# Patient Record
Sex: MALE | Race: WHITE | Employment: UNEMPLOYED | URBAN - METROPOLITAN AREA
[De-identification: names, ages, dates, MRNs, and addresses within clinical notes are randomized per-mention and may not be internally consistent; named-entity substitution may affect disease eponyms.]

---

## 2023-02-08 ENCOUNTER — APPOINTMENT (OUTPATIENT)
Dept: GENERAL RADIOLOGY | Age: 43
DRG: 280 | End: 2023-02-08
Attending: INTERNAL MEDICINE
Payer: COMMERCIAL

## 2023-02-08 ENCOUNTER — HOSPITAL ENCOUNTER (INPATIENT)
Age: 43
LOS: 7 days | Discharge: HOME | DRG: 280 | End: 2023-02-15
Attending: INTERNAL MEDICINE | Admitting: INTERNAL MEDICINE
Payer: COMMERCIAL

## 2023-02-08 DIAGNOSIS — I50.9 ACUTE DECOMPENSATED HEART FAILURE (HCC): Primary | ICD-10-CM

## 2023-02-08 PROBLEM — I21.4 NSTEMI (NON-ST ELEVATED MYOCARDIAL INFARCTION) (HCC): Status: ACTIVE | Noted: 2023-02-08

## 2023-02-08 LAB
A/G RATIO: 1.2 (ref 1.1–2.2)
ALBUMIN SERPL-MCNC: 3.6 G/DL (ref 3.4–5)
ALP BLD-CCNC: 93 U/L (ref 40–129)
ALT SERPL-CCNC: 20 U/L (ref 10–40)
ANION GAP SERPL CALCULATED.3IONS-SCNC: 9 MMOL/L (ref 3–16)
APTT: 24 SEC (ref 23–34.3)
AST SERPL-CCNC: 24 U/L (ref 15–37)
BILIRUB SERPL-MCNC: 0.5 MG/DL (ref 0–1)
BUN BLDV-MCNC: 12 MG/DL (ref 7–20)
CALCIUM SERPL-MCNC: 9.3 MG/DL (ref 8.3–10.6)
CHLORIDE BLD-SCNC: 101 MMOL/L (ref 99–110)
CO2: 32 MMOL/L (ref 21–32)
CREAT SERPL-MCNC: 0.8 MG/DL (ref 0.9–1.3)
GFR SERPL CREATININE-BSD FRML MDRD: >60 ML/MIN/{1.73_M2}
GLUCOSE BLD-MCNC: 90 MG/DL (ref 70–99)
HCT VFR BLD CALC: 44.3 % (ref 40.5–52.5)
HEMOGLOBIN: 14.5 G/DL (ref 13.5–17.5)
INR BLD: 1.01 (ref 0.87–1.14)
MCH RBC QN AUTO: 30.2 PG (ref 26–34)
MCHC RBC AUTO-ENTMCNC: 32.7 G/DL (ref 31–36)
MCV RBC AUTO: 92.2 FL (ref 80–100)
PDW BLD-RTO: 14.6 % (ref 12.4–15.4)
PLATELET # BLD: 193 K/UL (ref 135–450)
PMV BLD AUTO: 9.3 FL (ref 5–10.5)
POTASSIUM REFLEX MAGNESIUM: 4.5 MMOL/L (ref 3.5–5.1)
PROTHROMBIN TIME: 13.2 SEC (ref 11.7–14.5)
RBC # BLD: 4.81 M/UL (ref 4.2–5.9)
SODIUM BLD-SCNC: 142 MMOL/L (ref 136–145)
TOTAL PROTEIN: 6.6 G/DL (ref 6.4–8.2)
TROPONIN: 0.37 NG/ML
TROPONIN: 0.38 NG/ML
WBC # BLD: 7.6 K/UL (ref 4–11)

## 2023-02-08 PROCEDURE — 6370000000 HC RX 637 (ALT 250 FOR IP): Performed by: INTERNAL MEDICINE

## 2023-02-08 PROCEDURE — 85730 THROMBOPLASTIN TIME PARTIAL: CPT

## 2023-02-08 PROCEDURE — 2580000003 HC RX 258: Performed by: INTERNAL MEDICINE

## 2023-02-08 PROCEDURE — 80053 COMPREHEN METABOLIC PANEL: CPT

## 2023-02-08 PROCEDURE — 85027 COMPLETE CBC AUTOMATED: CPT

## 2023-02-08 PROCEDURE — 6360000002 HC RX W HCPCS: Performed by: INTERNAL MEDICINE

## 2023-02-08 PROCEDURE — 71045 X-RAY EXAM CHEST 1 VIEW: CPT

## 2023-02-08 PROCEDURE — 36415 COLL VENOUS BLD VENIPUNCTURE: CPT

## 2023-02-08 PROCEDURE — 99255 IP/OBS CONSLTJ NEW/EST HI 80: CPT | Performed by: INTERNAL MEDICINE

## 2023-02-08 PROCEDURE — 85520 HEPARIN ASSAY: CPT

## 2023-02-08 PROCEDURE — 2060000000 HC ICU INTERMEDIATE R&B

## 2023-02-08 PROCEDURE — 84484 ASSAY OF TROPONIN QUANT: CPT

## 2023-02-08 PROCEDURE — 83036 HEMOGLOBIN GLYCOSYLATED A1C: CPT

## 2023-02-08 PROCEDURE — 85610 PROTHROMBIN TIME: CPT

## 2023-02-08 RX ORDER — ONDANSETRON 4 MG/1
4 TABLET, ORALLY DISINTEGRATING ORAL EVERY 8 HOURS PRN
Status: DISCONTINUED | OUTPATIENT
Start: 2023-02-08 | End: 2023-02-15 | Stop reason: HOSPADM

## 2023-02-08 RX ORDER — ROSUVASTATIN CALCIUM 20 MG/1
40 TABLET, COATED ORAL NIGHTLY
Status: DISCONTINUED | OUTPATIENT
Start: 2023-02-08 | End: 2023-02-15 | Stop reason: HOSPADM

## 2023-02-08 RX ORDER — ACETAMINOPHEN 325 MG/1
650 TABLET ORAL EVERY 6 HOURS PRN
Status: DISCONTINUED | OUTPATIENT
Start: 2023-02-08 | End: 2023-02-10 | Stop reason: SDUPTHER

## 2023-02-08 RX ORDER — ASPIRIN 81 MG/1
81 TABLET, CHEWABLE ORAL DAILY
Status: DISCONTINUED | OUTPATIENT
Start: 2023-02-09 | End: 2023-02-15 | Stop reason: HOSPADM

## 2023-02-08 RX ORDER — FUROSEMIDE 10 MG/ML
20 INJECTION INTRAMUSCULAR; INTRAVENOUS ONCE
Status: COMPLETED | OUTPATIENT
Start: 2023-02-08 | End: 2023-02-08

## 2023-02-08 RX ORDER — POLYETHYLENE GLYCOL 3350 17 G/17G
17 POWDER, FOR SOLUTION ORAL DAILY PRN
Status: DISCONTINUED | OUTPATIENT
Start: 2023-02-08 | End: 2023-02-15 | Stop reason: HOSPADM

## 2023-02-08 RX ORDER — SODIUM CHLORIDE 0.9 % (FLUSH) 0.9 %
5-40 SYRINGE (ML) INJECTION EVERY 12 HOURS SCHEDULED
Status: DISCONTINUED | OUTPATIENT
Start: 2023-02-08 | End: 2023-02-15 | Stop reason: HOSPADM

## 2023-02-08 RX ORDER — ONDANSETRON 2 MG/ML
4 INJECTION INTRAMUSCULAR; INTRAVENOUS EVERY 6 HOURS PRN
Status: DISCONTINUED | OUTPATIENT
Start: 2023-02-08 | End: 2023-02-15 | Stop reason: HOSPADM

## 2023-02-08 RX ORDER — HEPARIN SODIUM 10000 [USP'U]/100ML
5-30 INJECTION, SOLUTION INTRAVENOUS CONTINUOUS
Status: DISPENSED | OUTPATIENT
Start: 2023-02-08 | End: 2023-02-10

## 2023-02-08 RX ORDER — SODIUM CHLORIDE 0.9 % (FLUSH) 0.9 %
5-40 SYRINGE (ML) INJECTION PRN
Status: DISCONTINUED | OUTPATIENT
Start: 2023-02-08 | End: 2023-02-15 | Stop reason: HOSPADM

## 2023-02-08 RX ORDER — HEPARIN SODIUM 1000 [USP'U]/ML
4000 INJECTION, SOLUTION INTRAVENOUS; SUBCUTANEOUS PRN
Status: DISCONTINUED | OUTPATIENT
Start: 2023-02-08 | End: 2023-02-10

## 2023-02-08 RX ORDER — ACETAMINOPHEN 650 MG/1
650 SUPPOSITORY RECTAL EVERY 6 HOURS PRN
Status: DISCONTINUED | OUTPATIENT
Start: 2023-02-08 | End: 2023-02-10 | Stop reason: SDUPTHER

## 2023-02-08 RX ORDER — HEPARIN SODIUM 1000 [USP'U]/ML
2000 INJECTION, SOLUTION INTRAVENOUS; SUBCUTANEOUS PRN
Status: DISCONTINUED | OUTPATIENT
Start: 2023-02-08 | End: 2023-02-10

## 2023-02-08 RX ORDER — SODIUM CHLORIDE 9 MG/ML
INJECTION, SOLUTION INTRAVENOUS PRN
Status: DISCONTINUED | OUTPATIENT
Start: 2023-02-08 | End: 2023-02-15 | Stop reason: HOSPADM

## 2023-02-08 RX ADMIN — SODIUM CHLORIDE, PRESERVATIVE FREE 10 ML: 5 INJECTION INTRAVENOUS at 22:00

## 2023-02-08 RX ADMIN — NITROGLYCERIN 0.5 INCH: 20 OINTMENT TOPICAL at 22:04

## 2023-02-08 RX ADMIN — ROSUVASTATIN 40 MG: 20 TABLET, FILM COATED ORAL at 22:01

## 2023-02-08 RX ADMIN — FUROSEMIDE 20 MG: 10 INJECTION, SOLUTION INTRAMUSCULAR; INTRAVENOUS at 17:54

## 2023-02-08 RX ADMIN — HEPARIN SODIUM 10 UNITS/KG/HR: 10000 INJECTION, SOLUTION INTRAVENOUS at 17:58

## 2023-02-08 NOTE — PROGRESS NOTES
Received patient from EMS transport as a direct admit from [facility]. Patient currently cannot be admitted into Epic system due to administration, there are currently no orders with exception to admit to inpatient. Patient in no distress, laughing and joking with staff. Report from EMS includes having no\ issues on the way here. Patient was on heparin gtt but due to no order, stopped gtt until provider can be updated. Sent secure message to Dr Stephen Mcguire who stated Dr Markus Gamboa was oncall. Sent perfect serve to that provider as well.

## 2023-02-08 NOTE — CONSULTS
Cardiology Consultation/History and Physical                                                                  Pt Name: Nayely Madrid  Age: 40 y.o. Sex: male  : 1980  Location: [id number]/7856-23    Referring Physician: Melina Ramirez MD        Reason for Consult:     Reason for Consultation/Chief Complaint: Chest pain    HPI:      Nayely Madrid is a 40 y.o. male with a past medical history of hypertension, PENG?, tobacco use who was transferred from [facility] with complaint of chest pain. Patient apparently has been having intermittent chest discomfort for over a month. At some point the pain was fairly constant and he decided to present to [facility] for further evaluation. His troponin was elevated there. The plan was to transfer the patient to [facility] for possible cardiac catheterization. Unfortunately, this did not happen for 2 days bed availability. Currently pain-free    Histories     Past Medical History:   has a past medical history of Chronic kidney disease (CKD), Hemodialysis patient ([address] and Hypertension. Surgical History:   has no past surgical history on file. Social History:   reports that he has been smoking cigarettes. He has a 22.00 pack-year smoking history. He has been exposed to tobacco smoke. He has never used smokeless tobacco. He reports that he does not drink alcohol and does not use drugs. Family History:  No evidence for sudden cardiac death or premature CAD      Medications:       Home Medications  Were reviewed and are listed in nursing record. and/or listed below  Prior to Admission medications    Medication Sig Start Date End Date Taking? Authorizing Provider   ibuprofen (IBU) 600 MG tablet Take 1 tablet by mouth every 6 hours as needed for Pain for 20 doses.  13   Leticia Barboza MD          Inpatient Medications:   sodium chloride flush  5-40 mL IntraVENous 2 times per day    [START ON 2023] aspirin  81 mg Oral Daily    rosuvastatin 40 mg Oral Nightly       IV drips:   sodium chloride      heparin (PORCINE) Infusion 10 Units/kg/hr (02/08/23 8023)       PRN:  sodium chloride flush, sodium chloride, ondansetron **OR** ondansetron, acetaminophen **OR** acetaminophen, polyethylene glycol, heparin (porcine), heparin (porcine), perflutren lipid microspheres    Allergy:     Patient has no known allergies. Review of Systems:     All 12 point review of symptoms completed. Pertinent positives identified in the HPI, all other review of symptoms negative as below. CONSTITUTIONAL: No fatigue  SKIN: No rash or pruritis. EYES: No visual changes or diplopia. No scleral icterus. ENT: No Headaches, hearing loss or vertigo. No mouth sores or sore throat. CARDIOVASCULAR: see HPI  RESPIRATORY: see HPI  GASTROINTESTINAL: No N/V/D. No abdominal pain, appetite loss, blood in stools. GENITOURINARY: No dysuria, trouble voiding, or hematuria. MUSCULOSKELETAL:  No gait disturbance, weakness or joint complaints. NEUROLOGICAL: No headache, diplopia, change in muscle strength, numbness or tingling. No change in gait, balance, coordination, mood, affect, memory, mentation, behavior. ENDOCRINE: No excessive thirst, fluid intake, or urination. No tremor. HEMATOLOGIC: No abnormal bruising or bleeding. ALLERGY: No nasal congestion or hives.       Physical Examination:     Vitals:    02/08/23 1620   BP: 115/85   Pulse: (!) 113   Resp: 18   Temp: 98 °F (36.7 °C)   TempSrc: Oral   SpO2: 96%   Weight: 210 lb 1.6 oz (95.3 kg)   Height: 5' 11\" (1.803 m)       Wt Readings from Last 3 Encounters:   02/08/23 210 lb 1.6 oz (95.3 kg)         General Appearance:  Alert, cooperative, no distress, appears stated age Appropriate weight   Head:  Normocephalic, without obvious abnormality, atraumatic   Eyes:  PERRL, conjunctiva/corneas clear EOM intact  Ears normal   Throat no lesions       Nose: Nares normal, no drainage or sinus tenderness   Throat: Lips, mucosa, and tongue normal   Neck: Supple, symmetrical, trachea midline, no adenopathy, thyroid: not enlarged, symmetric, no tenderness/mass/nodules, no carotid bruit. Lungs:   Respirations unlabored, clear to auscultation bilaterally, without any wheezes, rubs or ronchi. Chest Wall:  No tenderness or deformity   Heart:  Regular rhythm, rate is controlled, S1, S2 normal, there is no murmur, there is no rub or gallop, cannot assess jvd, no bilateral lower extremity edema   Abdomen:   Soft, non-tender, bowel sounds active all four quadrants,  no masses, no organomegaly       Extremities: Extremities normal, atraumatic, no cyanosis. Pulses: 2+ and symmetric   Skin: Skin color, texture, turgor normal, no rashes or lesions   Pysch: Normal mood and affect   Neurologic: Normal gross motor and sensory exam.  Cranial nerves intact        Labs:     No results for input(s): NA, K, BUN, CREATININE, CL, CO2, GLUCOSE, CALCIUM, MG in the last 72 hours. No results for input(s): WBC, HGB, HCT, PLT, MCV in the last 72 hours. No results for input(s): CHOLTOT, TRIG, HDL, CHOLHDL, LDL in the last 72 hours. Invalid input(s): Rain Level  No results for input(s): PTT, INR in the last 72 hours. Invalid input(s): PT  No results for input(s): CKTOTAL, CKMB, CKMBINDEX, TROPONINI in the last 72 hours. No results for input(s): BNP in the last 72 hours. No results for input(s): TSH in the last 72 hours. No results for input(s): CHOL, HDL, LDLCALC, TRIG in the last 72 hours.]    No results found for: CKTOTAL, CKMB, CKMBINDEX, TROPONINI      Imaging:     CT of the chest reviewed feels patchy bilateral air space disease consistent with edema versus pneumonia      Assessment / Plan:     NSTEMI  Hypoxemic respiratory failure  Acute kidney injury?     Obtain labs today CBC, CMP, troponin  Remains fairly hypoxic  Chest x-ray, will need to be respiratory status optimized prior to cardiac catheterization  Anti-Platelet therapy with aspirin  Heparin gtt (high risk med) will need to monitor labs for toxicity  Plan for left heart cath, possible PCI tomorrow afternoon if able to lay flat  Risks,  benefits and alternatives to cardiac catheterization and possible intervention were discussed with the patient bleeding heart attack, stroke, kidney failure and limb loss. He wishes to proceed. I have personally reviewed the reports and images of labs, radiological studies, cardiac studies including ECG's and telemetry, current and old medical records. The note was completed using EMR and Dragon dictation system. Every effort was made to ensure accuracy; however, inadvertent computerized transcription errors may be present. I would like to thank you for providing me the opportunity to participate in the care of your patient. If you have any questions, please do not hesitate to contact me.      Inés Cox MD, [facility]  The [address]  [address] [place] [id number]  Ph: [phone]  Fax: [phone]

## 2023-02-08 NOTE — PROGRESS NOTES
Patient admitted to 4314 w/ c/o SOB, no chest pains. States that he noticed an increase in SOB while ambulating since 1/17/2023. Reports his PCP had done labs but did not do anything else. Reports he came to the ER b/c the SOB was getting worse. He presents being diaphoretic but denies any SOB. Able to wean from 3L to 2L, w/ goal to SpO2 above 89%. Dr Amisha Cee at bedside and wants O2 to be weaned down as tolerated. Skin check revealed no skin issues besides being diaphoretic and skin red in color, reports needing glasses but does not have them and left his upper/lower dentures at home. Heparin gtt ordered to restart at initial rate of 10 ml/hour until AntiXa levels resulted. Reports having a history of CKD with being on HD for 190 days 3xweek, states his CKD was from unknown origin.

## 2023-02-08 NOTE — H&P
Hospital Medicine History & Physical      PCP: No primary care provider on file. Date of Admission: 2/8/2023    Date of Service: Pt seen/examined on 02/08/2023 and Admitted to Inpatient with expected LOS greater than two midnights due to medical therapy. Chief Complaint:  shortness of breath worse w/ exertion      History Of Present Illness:     40 y.o. male Farrah Romero history of PENG needing dialysis for 6 months, nicotine dependence 1 pack/day x 20 years presented to [facility] on 2/6 with complains of shortness of breath worse with exertion. 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 Patient's d-dimer is greatly elevated so a CT anterior of the chest was performed which was negative for PE but did confirm bilateral airspace disease. Initially was planning to transfer to [facility].  02/08 morning, had an episode of diaphoresis, repeat troponin was 0.87, EKG shows inferior and V4 to V6 T wave inversions, patient was transferred to [facility]. for further management of NSTEMI. On my evaluation he says his shortness of breath is improved, he was on 4 L oxygen, he was satting 95%, able to come down on 3 L, will continue to wean him off      Past Medical History:          Diagnosis Date    Chronic kidney disease (CKD) 02/01/2018    Estimated by patient    Hemodialysis patient ([address]     Reports 190 days of 3x week    Hypertension        Past Surgical History:      No past surgical history on file. Medications Prior to Admission:      Prior to Admission medications    Medication Sig Start Date End Date Taking?  Authorizing Provider   ibuprofen (IBU) 600 MG tablet Take 1 tablet by mouth every 6 hours as needed for Pain for 20 doses. 12/4/13   Saran Boyce MD       Allergies:  Patient has no known allergies. Social History:      The patient currently lives at home    TOBACCO:   reports that he has been smoking cigarettes. He has a 22.00 pack-year smoking history. He has been exposed to tobacco smoke. He has never used smokeless tobacco.  ETOH:   reports no history of alcohol use. Family History:      reviewed    No family history on file. REVIEW OF SYSTEMS:   Pertinent positives as noted in the HPI. All other systems reviewed and negative. PHYSICAL EXAM PERFORMED:    /85   Pulse (!) 113   Temp 98 °F (36.7 °C) (Oral)   Resp 18   Ht 5' 11\" (1.803 m)   Wt 216 lb (98 kg)   SpO2 96%   BMI 30.13 kg/m²     General appearance:  No apparent distress, appears stated age and cooperative. HEENT:  Normal cephalic, atraumatic without obvious deformity. Pupils equal, round, and reactive to light. Extra ocular muscles intact. Conjunctivae/corneas clear. Neck: Supple, with full range of motion. No jugular venous distention. Trachea midline. Respiratory:  Normal respiratory effort. Bibasilar crackles present  Cardiovascular:  Regular rate and rhythm with normal S1/S2 without murmurs, rubs or gallops. Abdomen: Soft, non-tender, non-distended with normal bowel sounds. Musculoskeletal:  No clubbing, cyanosis or edema bilaterally. Full range of motion without deformity. Skin: Skin color, texture, turgor normal.  No rashes or lesions. Neurologic:  Neurovascularly intact without any focal sensory/motor deficits. Cranial nerves: II-XII intact, grossly non-focal.  Psychiatric:  Alert and oriented, thought content appropriate, normal insight  Capillary Refill: Brisk,< 3 seconds   Peripheral Pulses: +2 palpable, equal bilaterally       Labs:     No results for input(s): WBC, HGB, HCT, PLT in the last 72 hours.   No results for input(s): NA, K, CL, CO2, BUN, CREATININE, CALCIUM, PHOS in the last 72 hours.    Invalid input(s): MAGNES  No results for input(s): AST, ALT, BILIDIR, BILITOT, ALKPHOS in the last 72 hours. No results for input(s): INR in the last 72 hours. No results for input(s): Prentis Revels in the last 72 hours. Urinalysis:    No results found for: [facility] [address], [address] [facility], [facility] [address], [facility]    Radiology:     CXR: I have reviewed the CXR with the following interpretation: ordered  EKG:  I have reviewed the EKG with the following interpretation:   Reviewed EKGs from [address] [place] [address], see HPI    CTPA for elevated D-Dimer @ OSH ([address] [place] [address])  IMPRESSION:  1. Bilateral infiltrates which are patchy groundglass and diffuse suspicious for mild bilateral pneumonia. There is mediastinal adenopathy possibly reactive. 2. Small bilateral pleural effusion  Follow-up until resolution is suggested  3. Other findings as described    No orders to display       ASSESSMENT/PLAN:    Active Hospital Problems    Diagnosis Date Noted    NSTEMI (non-ST elevated myocardial infarction) ([facility] [address] [I21.4] 02/08/2023     Priority: Medium     NSTEMI, Trop elevation up to 1.8 initial EKG at [facility] showed inferior lateral ST depressions, EKG repeated 02/08 with T wave inversions in inferior and left lateral leads. His troponin 02/08 morning was 0.87. His shortness of breath is improved but still there on exertion.   We will repeat labs including CBC, APTT restart heparin drip according to the levels  Cardiology consulted for evaluation  Monitor on telemetry    Heart failure exacerbation with pulmonary edema, was on IV diuresis in [facility], will give 20 mg IV Lasix pending labs  Check echocardiogram for further evaluation of structural heart abnormalities    Acute resp failure w/ hypoxemia due to pulmonary edema  Give IV Lasix  Continue wean off oxygen for SpO2 89-90%    Nicotine dependence, he wants to quit does not want nicotine patch, I have appreciated him on his decision    History of CKD? History of PENG in the past but he says he was at [facility] a needed dialysis about 190 days. Monitor renal profile closely  We will try to obtain records from [facility]    DVT Prophylaxis: Heparin gtt  Diet: ADULT DIET; Clear Liquid; No Caffeine  Code Status: Full Code    PT/OT Eval Status: evaluate daily, order if needed    Dispo - Admit as inpatient. I anticipate hospitalization spanning more than two midnights for investigation and treatment of the above medically necessary diagnoses. Dorothy Gibson MD, [facility]  Hospitalist  Attending Physician    Thank you No primary care provider on file. for the opportunity to be involved in this patient's care. If you have any questions or concerns please feel free to contact me at [phone].

## 2023-02-09 ENCOUNTER — APPOINTMENT (OUTPATIENT)
Dept: CARDIAC CATH/INVASIVE PROCEDURES | Age: 43
DRG: 280 | End: 2023-02-09
Attending: INTERNAL MEDICINE
Payer: COMMERCIAL

## 2023-02-09 ENCOUNTER — APPOINTMENT (OUTPATIENT)
Dept: GENERAL RADIOLOGY | Age: 43
DRG: 280 | End: 2023-02-09
Attending: INTERNAL MEDICINE
Payer: COMMERCIAL

## 2023-02-09 PROBLEM — E78.2 MIXED HYPERLIPIDEMIA: Status: ACTIVE | Noted: 2023-02-09

## 2023-02-09 PROBLEM — J96.01 ACUTE RESPIRATORY FAILURE WITH HYPOXEMIA (HCC): Status: ACTIVE | Noted: 2023-02-09

## 2023-02-09 LAB
ANTI-XA UNFRAC HEPARIN: 0.33 IU/ML (ref 0.3–0.7)
ANTI-XA UNFRAC HEPARIN: <0.1 IU/ML (ref 0.3–0.7)
ANTI-XA UNFRAC HEPARIN: <0.1 IU/ML (ref 0.3–0.7)
CHOLESTEROL, TOTAL: 155 MG/DL (ref 0–199)
EKG ATRIAL RATE: 108 BPM
EKG DIAGNOSIS: NORMAL
EKG P AXIS: 71 DEGREES
EKG P-R INTERVAL: 144 MS
EKG Q-T INTERVAL: 362 MS
EKG QRS DURATION: 104 MS
EKG QTC CALCULATION (BAZETT): 485 MS
EKG R AXIS: 66 DEGREES
EKG T AXIS: 228 DEGREES
EKG VENTRICULAR RATE: 108 BPM
ESTIMATED AVERAGE GLUCOSE: 122.6 MG/DL
HBA1C MFR BLD: 5.9 %
HCT VFR BLD CALC: 45.3 % (ref 40.5–52.5)
HDLC SERPL-MCNC: 25 MG/DL (ref 40–60)
HEMOGLOBIN: 14.9 G/DL (ref 13.5–17.5)
LDL CHOLESTEROL CALCULATED: 116 MG/DL
LV EF: 20 %
LVEF MODALITY: NORMAL
MCH RBC QN AUTO: 30.6 PG (ref 26–34)
MCHC RBC AUTO-ENTMCNC: 32.9 G/DL (ref 31–36)
MCV RBC AUTO: 93.1 FL (ref 80–100)
PDW BLD-RTO: 14.5 % (ref 12.4–15.4)
PLATELET # BLD: 205 K/UL (ref 135–450)
PMV BLD AUTO: 9 FL (ref 5–10.5)
RBC # BLD: 4.86 M/UL (ref 4.2–5.9)
TRIGL SERPL-MCNC: 70 MG/DL (ref 0–150)
VLDLC SERPL CALC-MCNC: 14 MG/DL
WBC # BLD: 8.8 K/UL (ref 4–11)

## 2023-02-09 PROCEDURE — 2500000003 HC RX 250 WO HCPCS

## 2023-02-09 PROCEDURE — 80061 LIPID PANEL: CPT

## 2023-02-09 PROCEDURE — 93005 ELECTROCARDIOGRAM TRACING: CPT | Performed by: INTERNAL MEDICINE

## 2023-02-09 PROCEDURE — 2060000000 HC ICU INTERMEDIATE R&B

## 2023-02-09 PROCEDURE — 6370000000 HC RX 637 (ALT 250 FOR IP): Performed by: INTERNAL MEDICINE

## 2023-02-09 PROCEDURE — 36415 COLL VENOUS BLD VENIPUNCTURE: CPT

## 2023-02-09 PROCEDURE — 6360000002 HC RX W HCPCS

## 2023-02-09 PROCEDURE — 6360000002 HC RX W HCPCS: Performed by: INTERNAL MEDICINE

## 2023-02-09 PROCEDURE — 85027 COMPLETE CBC AUTOMATED: CPT

## 2023-02-09 PROCEDURE — 6360000004 HC RX CONTRAST MEDICATION: Performed by: INTERNAL MEDICINE

## 2023-02-09 PROCEDURE — C8929 TTE W OR WO FOL WCON,DOPPLER: HCPCS

## 2023-02-09 PROCEDURE — 99233 SBSQ HOSP IP/OBS HIGH 50: CPT | Performed by: NURSE PRACTITIONER

## 2023-02-09 PROCEDURE — 0202U NFCT DS 22 TRGT SARS-COV-2: CPT

## 2023-02-09 PROCEDURE — 71046 X-RAY EXAM CHEST 2 VIEWS: CPT

## 2023-02-09 PROCEDURE — 93010 ELECTROCARDIOGRAM REPORT: CPT | Performed by: INTERNAL MEDICINE

## 2023-02-09 PROCEDURE — 85520 HEPARIN ASSAY: CPT

## 2023-02-09 RX ORDER — METOPROLOL SUCCINATE 25 MG/1
12.5 TABLET, EXTENDED RELEASE ORAL DAILY
Status: DISCONTINUED | OUTPATIENT
Start: 2023-02-09 | End: 2023-02-10

## 2023-02-09 RX ORDER — FUROSEMIDE 10 MG/ML
40 INJECTION INTRAMUSCULAR; INTRAVENOUS 2 TIMES DAILY
Status: DISCONTINUED | OUTPATIENT
Start: 2023-02-09 | End: 2023-02-10

## 2023-02-09 RX ORDER — POTASSIUM CHLORIDE 20 MEQ/1
20 TABLET, EXTENDED RELEASE ORAL ONCE
Status: COMPLETED | OUTPATIENT
Start: 2023-02-09 | End: 2023-02-09

## 2023-02-09 RX ADMIN — HEPARIN SODIUM 4000 UNITS: 1000 INJECTION INTRAVENOUS; SUBCUTANEOUS at 17:52

## 2023-02-09 RX ADMIN — METOPROLOL SUCCINATE 12.5 MG: 25 TABLET, EXTENDED RELEASE ORAL at 17:53

## 2023-02-09 RX ADMIN — HEPARIN SODIUM 18 UNITS/KG/HR: 10000 INJECTION, SOLUTION INTRAVENOUS at 17:52

## 2023-02-09 RX ADMIN — PERFLUTREN 1.5 ML: 6.52 INJECTION, SUSPENSION INTRAVENOUS at 13:27

## 2023-02-09 RX ADMIN — ROSUVASTATIN 40 MG: 20 TABLET, FILM COATED ORAL at 19:52

## 2023-02-09 RX ADMIN — POTASSIUM CHLORIDE 20 MEQ: 1500 TABLET, EXTENDED RELEASE ORAL at 17:30

## 2023-02-09 RX ADMIN — HEPARIN SODIUM 4000 UNITS: 1000 INJECTION INTRAVENOUS; SUBCUTANEOUS at 00:53

## 2023-02-09 RX ADMIN — FUROSEMIDE 40 MG: 10 INJECTION, SOLUTION INTRAMUSCULAR; INTRAVENOUS at 17:32

## 2023-02-09 RX ADMIN — NITROGLYCERIN 0.5 INCH: 20 OINTMENT TOPICAL at 12:43

## 2023-02-09 RX ADMIN — ASPIRIN 81 MG 81 MG: 81 TABLET ORAL at 09:24

## 2023-02-09 RX ADMIN — NITROGLYCERIN 0.5 INCH: 20 OINTMENT TOPICAL at 06:43

## 2023-02-09 NOTE — PROGRESS NOTES
Found patient asleep with oxygen off. Patient was satting at 85-86% on RA. Replaced NC and increased to 4L d/t slow recovery. Will continue to monitor and wean as appropriate.

## 2023-02-09 NOTE — PLAN OF CARE
Problem: Respiratory - Adult  Goal: Achieves optimal ventilation and oxygenation  Outcome: Progressing  Flowsheets (Taken 2/9/2023 0654)  Achieves optimal ventilation and oxygenation:   Assess for changes in respiratory status   Assess for changes in mentation and behavior   Position to facilitate oxygenation and minimize respiratory effort   Oxygen supplementation based on oxygen saturation or arterial blood gases   Initiate smoking cessation protocol as indicated   Encourage broncho-pulmonary hygiene including cough, deep breathe, incentive spirometry     Problem: Cardiovascular - Adult  Goal: Maintains optimal cardiac output and hemodynamic stability  Outcome: Progressing  Flowsheets (Taken 2/9/2023 0654)  Maintains optimal cardiac output and hemodynamic stability:   Monitor blood pressure and heart rate   Monitor urine output and notify Licensed Independent Practitioner for values outside of normal range   Assess for signs of decreased cardiac output     Problem: Cardiovascular - Adult  Goal: Absence of cardiac dysrhythmias or at baseline  Outcome: Progressing  Flowsheets (Taken 2/9/2023 0654)  Absence of cardiac dysrhythmias or at baseline:   Monitor cardiac rate and rhythm   Assess for signs of decreased cardiac output     Problem: Safety - Adult  Goal: Free from fall injury  Outcome: Progressing  Flowsheets (Taken 2/9/2023 0654)  Free From Fall Injury: Instruct family/caregiver on patient safety     Problem: ABCDS Injury Assessment  Goal: Absence of physical injury  Outcome: Progressing  Flowsheets (Taken 2/9/2023 0654)  Absence of Physical Injury: Implement safety measures based on patient assessment

## 2023-02-09 NOTE — PROGRESS NOTES
Pt off floor for echo. Dr. Alexandre Mansfield wants heparin gtt d/c per Forest Donovan. Called cardiology since pt is at echo and cardiology nurse to d/c heparin gtt.

## 2023-02-09 NOTE — PROGRESS NOTES
HOSPITALISTS PROGRESS NOTE    2/9/2023 1:47 PM        Name: Jena Navas . Admitted: 2/8/2023  Primary Care Provider: No primary care provider on file. (Tel: None)      Problem List  Active Problems:    NSTEMI (non-ST elevated myocardial infarction) ([address]  Resolved Problems:    * No resolved hospital problems. *       Assessment & Plan:   NSTEMI  Cath planning  Asa/statin heparin drip, cardiology consulted    Concerns of heart failure on outside hospital  Follow-up on echocardiogram    Patient does mention that he was on dialysis for about 6 months, renal function stable    Tobacco abuse    IV Access: Peripheral  Nunez: No  Diet: ADULT DIET; Clear Liquid; No Caffeine  Code:Full Code  DVT PPX heparin  Disposition monitor in the hospital      Brief Tuuwlq49 y.o. male Jena Navas history of PENG needing dialysis for 6 months, nicotine dependence 1 pack/day x 20 years presented to [facility] on 2/6 with complains of shortness of breath worse with exertion. 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 Patient's d-dimer is greatly elevated so a CT anterior of the chest was performed which was negative for PE but did confirm bilateral airspace disease. Initially was planning to transfer to [facility].  02/08 morning, had an episode of diaphoresis, repeat troponin was 0.87, EKG shows inferior and V4 to V6 T wave inversions, patient was transferred to [facility]. for further management of NSTEMI. CC: Chest pain    Subjective:  .     Patient is comfortable, patient denying any chest pain to me, patient is awaiting for his testing, patient is not on any oxygen    Reviewed interval ancillary notes    Current Medications  sodium chloride flush 0.9 % injection 5-40 mL, 2 times per day  sodium chloride flush 0.9 % injection 5-40 mL, PRN  0.9 % sodium chloride infusion, PRN  ondansetron (ZOFRAN-ODT) disintegrating tablet 4 mg, Q8H PRN   Or  ondansetron (ZOFRAN) injection 4 mg, Q6H PRN  acetaminophen (TYLENOL) tablet 650 mg, Q6H PRN   Or  acetaminophen (TYLENOL) suppository 650 mg, Q6H PRN  polyethylene glycol (GLYCOLAX) packet 17 g, Daily PRN  aspirin chewable tablet 81 mg, Daily  rosuvastatin (CRESTOR) tablet 40 mg, Nightly  heparin (porcine) injection 4,000 Units, PRN  heparin (porcine) injection 2,000 Units, PRN  heparin 25,000 units in dextrose 5% 250 mL (premix) infusion, Continuous  [Held by provider] nitroglycerin (NITRO-BID) 2 % ointment 0.5 inch, 4 times per day        Objective:  BP (!) 134/95   Pulse (!) 112   Temp 97.9 °F (36.6 °C) (Oral)   Resp 20   Ht 5' 11\" (1.803 m)   Wt 203 lb 11.3 oz (92.4 kg)   SpO2 96%   BMI 28.41 kg/m²     Intake/Output Summary (Last 24 hours) at 2/9/2023 1347  Last data filed at 2/9/2023 1238  Gross per 24 hour   Intake 480 ml   Output 4300 ml   Net -3820 ml      Wt Readings from Last 3 Encounters:   02/09/23 203 lb 11.3 oz (92.4 kg)     Alert awake oriented x3  Good diuresis of about 4 L  S1-S2 heard  No significant pedal edema  Neuro mental eyes moist  Lungs are clear I do not hear any crackles now    Labs and Tests:  CBC:   Recent Labs     02/08/23  1826 02/09/23  0649   WBC 7.6 8.8   HGB 14.5 14.9    205     BMP:    Recent Labs     02/08/23  1825      K 4.5      CO2 32   BUN 12   CREATININE 0.8*   GLUCOSE 90     Hepatic:   Recent Labs     02/08/23  1825   AST 24   ALT 20   BILITOT 0.5   ALKPHOS 93     XR CHEST 1 VIEW   Final Result   1. There is some hazy groundglass opacity seen projecting over the right midlung.  This may reflect some overlying soft tissue attenuation related to rotated patient positioning. Asymmetric pulmonary edema or pneumonitis is a differential consideration. This could be better characterized with dedicated PA and lateral chest radiograph.       XR CHEST (2 VW)    (Results Pending)                 Raymond Patterson MD   2/9/2023 1:47 PM

## 2023-02-09 NOTE — PROGRESS NOTES
CC: chest pain   HPI:    Mary Lou Cornell is a 40 y.o. male with a past medical history of hypertension, PENG?, tobacco use [facility]. Patient apparently has been having intermittent chest discomfort for over a month. At some point the pain was fairly constant and he decided to present to [facility] for further evaluation. His troponin was elevated there. The plan was to transfer the patient to [address] [place] [address] for possible cardiac catheterization. Unfortunately, this did not happen for 2 days bed availability. Currently pain-free      S: Seen this morning while laying flat in bed. Remains on 4 liters oxygen but denies sob or orthopnea. Denies cp      Tele: Sinus tach    O:  Physical Exam:  /83   Pulse (!) 114   Temp 97.8 °F (36.6 °C) (Oral)   Resp 20   Ht 5' 11\" (1.803 m)   Wt 203 lb 11.3 oz (92.4 kg)   SpO2 90%   BMI 28.41 kg/m²    General (appearance):  No acute distress  Eyes: anicteric   Neck: soft, No JVD  Ears/Nose/Mouth/Thorat: No cyanosis  CV: Reg, tach   Respiratory:  diminished, normal effort  GI: soft, non-tender, non-distended  Skin: Warm, dry. No rashes  Neuro/Psych: Alert and oriented x 3. Appropriate behavior  Ext:  No c/c. No edema  Pulses:  2+ radial     I.O's= -3.5    Weight  Admission: Weight: 210 lb 1.6 oz (95.3 kg)   Today: Weight: 203 lb 11.3 oz (92.4 kg)    CBC:   Recent Labs     02/08/23  1826 02/09/23  0649   WBC 7.6 8.8   HGB 14.5 14.9   HCT 44.3 45.3   MCV 92.2 93.1    205     BMP:   Recent Labs     02/08/23  1825      K 4.5      CO2 32   BUN 12   CREATININE 0.8*     Estimated Creatinine Clearance: 141 mL/min (A) (based on SCr of 0.8 mg/dL (L)).     Mag: No results found for: MG  LIVER PROFILE:   Recent Labs     02/08/23 1825   AST 24   ALT 20   BILITOT 0.5   ALKPHOS 93     PT/INR:   Recent Labs     02/08/23  1825   PROTIME 13.2   INR 1.01     Pro-BNP: No results found for: PROBNP  CARDIAC ENZYMES:   Recent Labs     02/08/23  1825   TROPONINI 0.38*  0.37*         Imaging:    CXR  . There is some hazy groundglass opacity seen projecting over the right midlung. This may reflect some overlying soft tissue attenuation related to rotated patient positioning. Asymmetric pulmonary edema or pneumonitis is a differential consideration. This could be better characterized with dedicated PA and lateral chest radiograph     Assessment:  40 y.o. with chest pain  Issues:  -NSTEMI  -Hypoxemic respiratory failure  PENG    Plan:  -Echo ordered   -Cath planned for this afternoon if pt can lay flat   -Heparin gtt   -Crestor  -ASA    Addendum:    Pt unable to lay flat.  Cath postponed  Lasix 40 mg IV daily

## 2023-02-10 PROBLEM — I50.9 ACUTE DECOMPENSATED HEART FAILURE (HCC): Status: ACTIVE | Noted: 2023-02-10

## 2023-02-10 PROBLEM — I42.8 NICM (NONISCHEMIC CARDIOMYOPATHY) (HCC): Status: ACTIVE | Noted: 2023-02-10

## 2023-02-10 LAB
A/G RATIO: 1.2 (ref 1.1–2.2)
ALBUMIN SERPL-MCNC: 3.9 G/DL (ref 3.4–5)
ALP BLD-CCNC: 109 U/L (ref 40–129)
ALT SERPL-CCNC: 17 U/L (ref 10–40)
AMPHETAMINE SCREEN, URINE: ABNORMAL
ANION GAP SERPL CALCULATED.3IONS-SCNC: 9 MMOL/L (ref 3–16)
ANION GAP SERPL CALCULATED.3IONS-SCNC: 9 MMOL/L (ref 3–16)
ANTI-XA UNFRAC HEPARIN: 0.42 IU/ML (ref 0.3–0.7)
ANTI-XA UNFRAC HEPARIN: 0.54 IU/ML (ref 0.3–0.7)
AST SERPL-CCNC: 22 U/L (ref 15–37)
BARBITURATE SCREEN URINE: ABNORMAL
BASOPHILS ABSOLUTE: 0.1 K/UL (ref 0–0.2)
BASOPHILS RELATIVE PERCENT: 0.7 %
BENZODIAZEPINE SCREEN, URINE: POSITIVE
BILIRUB SERPL-MCNC: 0.7 MG/DL (ref 0–1)
BUN BLDV-MCNC: 8 MG/DL (ref 7–20)
BUN BLDV-MCNC: 9 MG/DL (ref 7–20)
CALCIUM SERPL-MCNC: 8.9 MG/DL (ref 8.3–10.6)
CALCIUM SERPL-MCNC: 9.1 MG/DL (ref 8.3–10.6)
CANNABINOID SCREEN URINE: ABNORMAL
CHLORIDE BLD-SCNC: 93 MMOL/L (ref 99–110)
CHLORIDE BLD-SCNC: 94 MMOL/L (ref 99–110)
CO2: 36 MMOL/L (ref 21–32)
CO2: 36 MMOL/L (ref 21–32)
COCAINE METABOLITE SCREEN URINE: ABNORMAL
CREAT SERPL-MCNC: 0.8 MG/DL (ref 0.9–1.3)
CREAT SERPL-MCNC: 0.9 MG/DL (ref 0.9–1.3)
EOSINOPHILS ABSOLUTE: 0.1 K/UL (ref 0–0.6)
EOSINOPHILS RELATIVE PERCENT: 1.5 %
FENTANYL SCREEN, URINE: ABNORMAL
GFR SERPL CREATININE-BSD FRML MDRD: >60 ML/MIN/{1.73_M2}
GFR SERPL CREATININE-BSD FRML MDRD: >60 ML/MIN/{1.73_M2}
GLUCOSE BLD-MCNC: 108 MG/DL (ref 70–99)
GLUCOSE BLD-MCNC: 85 MG/DL (ref 70–99)
HCT VFR BLD CALC: 46.9 % (ref 40.5–52.5)
HCT VFR BLD CALC: 48.7 % (ref 40.5–52.5)
HEMOGLOBIN: 15.4 G/DL (ref 13.5–17.5)
HEMOGLOBIN: 16.2 G/DL (ref 13.5–17.5)
LYMPHOCYTES ABSOLUTE: 1.2 K/UL (ref 1–5.1)
LYMPHOCYTES RELATIVE PERCENT: 13 %
Lab: ABNORMAL
MCH RBC QN AUTO: 30.4 PG (ref 26–34)
MCH RBC QN AUTO: 31.1 PG (ref 26–34)
MCHC RBC AUTO-ENTMCNC: 32.8 G/DL (ref 31–36)
MCHC RBC AUTO-ENTMCNC: 33.2 G/DL (ref 31–36)
MCV RBC AUTO: 91.6 FL (ref 80–100)
MCV RBC AUTO: 94.6 FL (ref 80–100)
METHADONE SCREEN, URINE: ABNORMAL
MONOCYTES ABSOLUTE: 0.5 K/UL (ref 0–1.3)
MONOCYTES RELATIVE PERCENT: 5.7 %
NEUTROPHILS ABSOLUTE: 7.2 K/UL (ref 1.7–7.7)
NEUTROPHILS RELATIVE PERCENT: 79.1 %
OPIATE SCREEN URINE: ABNORMAL
OXYCODONE URINE: ABNORMAL
PDW BLD-RTO: 14.7 % (ref 12.4–15.4)
PDW BLD-RTO: 15 % (ref 12.4–15.4)
PH UA: 7
PHENCYCLIDINE SCREEN URINE: ABNORMAL
PLATELET # BLD: 203 K/UL (ref 135–450)
PLATELET # BLD: 217 K/UL (ref 135–450)
PMV BLD AUTO: 8.8 FL (ref 5–10.5)
PMV BLD AUTO: 9.2 FL (ref 5–10.5)
POTASSIUM REFLEX MAGNESIUM: 3.7 MMOL/L (ref 3.5–5.1)
POTASSIUM REFLEX MAGNESIUM: 3.9 MMOL/L (ref 3.5–5.1)
RBC # BLD: 4.95 M/UL (ref 4.2–5.9)
RBC # BLD: 5.32 M/UL (ref 4.2–5.9)
REPORT: NORMAL
RESPIRATORY PANEL PCR: NORMAL
SODIUM BLD-SCNC: 138 MMOL/L (ref 136–145)
SODIUM BLD-SCNC: 139 MMOL/L (ref 136–145)
TOTAL PROTEIN: 7.1 G/DL (ref 6.4–8.2)
WBC # BLD: 9.1 K/UL (ref 4–11)
WBC # BLD: 9.7 K/UL (ref 4–11)

## 2023-02-10 PROCEDURE — 2500000003 HC RX 250 WO HCPCS

## 2023-02-10 PROCEDURE — 99233 SBSQ HOSP IP/OBS HIGH 50: CPT | Performed by: NURSE PRACTITIONER

## 2023-02-10 PROCEDURE — 99152 MOD SED SAME PHYS/QHP 5/>YRS: CPT | Performed by: INTERNAL MEDICINE

## 2023-02-10 PROCEDURE — 6370000000 HC RX 637 (ALT 250 FOR IP): Performed by: INTERNAL MEDICINE

## 2023-02-10 PROCEDURE — 99152 MOD SED SAME PHYS/QHP 5/>YRS: CPT

## 2023-02-10 PROCEDURE — 2709999900 HC NON-CHARGEABLE SUPPLY

## 2023-02-10 PROCEDURE — 85520 HEPARIN ASSAY: CPT

## 2023-02-10 PROCEDURE — 6360000004 HC RX CONTRAST MEDICATION: Performed by: INTERNAL MEDICINE

## 2023-02-10 PROCEDURE — 80053 COMPREHEN METABOLIC PANEL: CPT

## 2023-02-10 PROCEDURE — 6360000002 HC RX W HCPCS: Performed by: INTERNAL MEDICINE

## 2023-02-10 PROCEDURE — 93453 R&L HRT CATH W/VENTRICLGRPHY: CPT | Performed by: INTERNAL MEDICINE

## 2023-02-10 PROCEDURE — 80307 DRUG TEST PRSMV CHEM ANLYZR: CPT

## 2023-02-10 PROCEDURE — 85027 COMPLETE CBC AUTOMATED: CPT

## 2023-02-10 PROCEDURE — B2111ZZ FLUOROSCOPY OF MULTIPLE CORONARY ARTERIES USING LOW OSMOLAR CONTRAST: ICD-10-PCS | Performed by: INTERNAL MEDICINE

## 2023-02-10 PROCEDURE — 99153 MOD SED SAME PHYS/QHP EA: CPT

## 2023-02-10 PROCEDURE — C1751 CATH, INF, PER/CENT/MIDLINE: HCPCS

## 2023-02-10 PROCEDURE — 2580000003 HC RX 258: Performed by: INTERNAL MEDICINE

## 2023-02-10 PROCEDURE — 6360000002 HC RX W HCPCS

## 2023-02-10 PROCEDURE — C1713 ANCHOR/SCREW BN/BN,TIS/BN: HCPCS

## 2023-02-10 PROCEDURE — 6370000000 HC RX 637 (ALT 250 FOR IP)

## 2023-02-10 PROCEDURE — 85025 COMPLETE CBC W/AUTO DIFF WBC: CPT

## 2023-02-10 PROCEDURE — 36415 COLL VENOUS BLD VENIPUNCTURE: CPT

## 2023-02-10 PROCEDURE — C1894 INTRO/SHEATH, NON-LASER: HCPCS

## 2023-02-10 PROCEDURE — 99223 1ST HOSP IP/OBS HIGH 75: CPT | Performed by: INTERNAL MEDICINE

## 2023-02-10 PROCEDURE — C1769 GUIDE WIRE: HCPCS

## 2023-02-10 PROCEDURE — 93460 R&L HRT ART/VENTRICLE ANGIO: CPT

## 2023-02-10 PROCEDURE — 4A023N8 MEASUREMENT OF CARDIAC SAMPLING AND PRESSURE, BILATERAL, PERCUTANEOUS APPROACH: ICD-10-PCS | Performed by: INTERNAL MEDICINE

## 2023-02-10 PROCEDURE — 2060000000 HC ICU INTERMEDIATE R&B

## 2023-02-10 RX ORDER — SODIUM CHLORIDE 0.9 % (FLUSH) 0.9 %
5-40 SYRINGE (ML) INJECTION EVERY 12 HOURS SCHEDULED
Status: DISCONTINUED | OUTPATIENT
Start: 2023-02-10 | End: 2023-02-15 | Stop reason: HOSPADM

## 2023-02-10 RX ORDER — METOPROLOL SUCCINATE 25 MG/1
25 TABLET, EXTENDED RELEASE ORAL DAILY
Status: DISCONTINUED | OUTPATIENT
Start: 2023-02-11 | End: 2023-02-15 | Stop reason: HOSPADM

## 2023-02-10 RX ORDER — SODIUM CHLORIDE 0.9 % (FLUSH) 0.9 %
5-40 SYRINGE (ML) INJECTION PRN
Status: DISCONTINUED | OUTPATIENT
Start: 2023-02-10 | End: 2023-02-15 | Stop reason: HOSPADM

## 2023-02-10 RX ORDER — ACETAMINOPHEN 325 MG/1
650 TABLET ORAL EVERY 4 HOURS PRN
Status: DISCONTINUED | OUTPATIENT
Start: 2023-02-10 | End: 2023-02-15 | Stop reason: HOSPADM

## 2023-02-10 RX ORDER — HEPARIN SODIUM 1000 [USP'U]/ML
4000 INJECTION, SOLUTION INTRAVENOUS; SUBCUTANEOUS PRN
Status: ACTIVE | OUTPATIENT
Start: 2023-02-10 | End: 2023-02-10

## 2023-02-10 RX ORDER — SODIUM CHLORIDE 9 MG/ML
INJECTION, SOLUTION INTRAVENOUS CONTINUOUS
Status: DISCONTINUED | OUTPATIENT
Start: 2023-02-10 | End: 2023-02-10

## 2023-02-10 RX ORDER — AMLODIPINE BESYLATE 5 MG/1
5 TABLET ORAL DAILY
Status: ON HOLD | COMMUNITY
End: 2023-02-15 | Stop reason: HOSPADM

## 2023-02-10 RX ORDER — HEPARIN SODIUM 1000 [USP'U]/ML
2000 INJECTION, SOLUTION INTRAVENOUS; SUBCUTANEOUS PRN
Status: ACTIVE | OUTPATIENT
Start: 2023-02-10 | End: 2023-02-10

## 2023-02-10 RX ADMIN — FUROSEMIDE 40 MG: 10 INJECTION, SOLUTION INTRAMUSCULAR; INTRAVENOUS at 08:19

## 2023-02-10 RX ADMIN — HEPARIN SODIUM 18 UNITS/KG/HR: 10000 INJECTION, SOLUTION INTRAVENOUS at 05:01

## 2023-02-10 RX ADMIN — IOHEXOL 100 ML: 350 INJECTION, SOLUTION INTRAVENOUS at 14:29

## 2023-02-10 RX ADMIN — APIXABAN 10 MG: 5 TABLET, FILM COATED ORAL at 21:03

## 2023-02-10 RX ADMIN — SODIUM CHLORIDE, PRESERVATIVE FREE 10 ML: 5 INJECTION INTRAVENOUS at 21:06

## 2023-02-10 RX ADMIN — HEPARIN SODIUM 18 UNITS/KG/HR: 10000 INJECTION, SOLUTION INTRAVENOUS at 15:35

## 2023-02-10 RX ADMIN — ASPIRIN 81 MG 81 MG: 81 TABLET ORAL at 08:18

## 2023-02-10 RX ADMIN — SODIUM CHLORIDE, PRESERVATIVE FREE 10 ML: 5 INJECTION INTRAVENOUS at 08:19

## 2023-02-10 RX ADMIN — METOPROLOL SUCCINATE 12.5 MG: 25 TABLET, EXTENDED RELEASE ORAL at 08:18

## 2023-02-10 RX ADMIN — HEPARIN SODIUM 5 UNITS/KG/HR: 10000 INJECTION, SOLUTION INTRAVENOUS at 13:48

## 2023-02-10 RX ADMIN — ROSUVASTATIN 40 MG: 20 TABLET, FILM COATED ORAL at 21:03

## 2023-02-10 NOTE — PROCEDURES
CARDIAC CATHETERIZATION      Tania Hsu (13 y.o., male)  1980  [id number]    2/10/2023      INDICATION(s):  Cardiomyopathy      PROCEDURE:    Left heart cath  Coronary angiography  Right heart cath    Risk, benefits alternatives to cardiac catheterization and possible intervention were discussed with the patient. Informed consent was obtained. The patient was brought to the cath lab and was prepped and draped in the normal sterile technique. 1% Lidocaine was used to anesthetize the site. The right radial  was cannulated and a 6 Fr sheath was inserted under ultrasonographic guidance. Continuous pulse-oximetry and ECG monitoring was performed, and frequent blood pressure assessment was performed. An independent trained observer pushed medications at my direction. We monitored the patient's level of consciousness and vital signs/physiologic status throughout the procedure (see start and stop times below). All catheter were advanced through the sheath over a guide wire and advanced under fluoroscopic guidance into the ascending aorta. We used 5 Fr catheters for right and left coronary angiography and to cross the aortic valve. Left ventricular pressure and pullback across aortic valve was recorded. The sheath was removed and hemostasis was obtained with a TR band/manual compression respectively. The patient was moved to the floor in stable condition. There were no complications. Sedation: 2 mg Versed, 25 mcg Fentanyl  Sedation start: 1326  Sedation stop: 1425      Estimated blood loss: <10 mL      HEMODYNAMIC / ANGIOGRAPHIC DATA:    RA 7/5, 3  RV 31/1, 5  PA 32/12, 22  PW 12    Left ventricular end diastolic pressure was 19 mmHg. There was no gradient across the aortic valve upon pullback. The left main coronary artery arises from the left coronary cusp giving rise to the left anterior descending artery and the left circumflex artery. The left main reveals normal coronaries.  Distal D3 appears occluded. The left anterior descending artery arises in normal fashion from left coronary giving rise to diagonals and septal branches. The LAD reveals normal coronaries. The left circumflex/OM system reveals normal coronaries. The right coronary artery is a right dominant vessel with no significant stenosis. CONCLUSIONS:  Normal coronaries  2. Probably occluded distal D3 due to embolization  3.    Low right and left sided pressure/normal co/ci      RECOMMENDATIONS:    Routine post cardiac catheterization care  Aggressive risk factor modification      Electronically signed by Dino Rees MD on 2/10/2023 at 2:48 PM

## 2023-02-10 NOTE — PLAN OF CARE
Problem: Discharge Planning  Goal: Discharge to home or other facility with appropriate resources  Outcome: Progressing  Flowsheets (Taken 2/9/2023 1940)  Discharge to home or other facility with appropriate resources:   Identify barriers to discharge with patient and caregiver   Arrange for needed discharge resources and transportation as appropriate   Identify discharge learning needs (meds, wound care, etc)  Note: Pt on heparin gtt. Unable to get heart cath today. Unable to tolerate lying flat. Pt receiving IV lasix. Problem: Respiratory - Adult  Goal: Achieves optimal ventilation and oxygenation  2/9/2023 1944 by Evelia Dye RN  Outcome: Progressing  Flowsheets (Taken 2/9/2023 1940)  Achieves optimal ventilation and oxygenation:   Assess for changes in respiratory status   Assess for changes in mentation and behavior   Position to facilitate oxygenation and minimize respiratory effort   Oxygen supplementation based on oxygen saturation or arterial blood gases   Encourage broncho-pulmonary hygiene including cough, deep breathe, incentive spirometry   Assess the need for suctioning and aspirate as needed   Assess and instruct to report shortness of breath or any respiratory difficulty  Note: Pt SOB when lying flat. SpO2 > 90% on 4L when in semifowlers position. Pt alert and oriented. Pt positioned to facilitate oxygenation. Pt instructed to report SOB and dyspnea. Problem: Cardiovascular - Adult  Goal: Maintains optimal cardiac output and hemodynamic stability  2/9/2023 1944 by Evelia Dye RN  Outcome: Progressing  Flowsheets (Taken 2/9/2023 1940)  Maintains optimal cardiac output and hemodynamic stability:   Monitor blood pressure and heart rate   Monitor urine output and notify Licensed Independent Practitioner for values outside of normal range   Assess for signs of decreased cardiac output  Note: Pt blood pressure WNL. HR is sinus tach on cont telemetry. Peripheral pulses +2. Good urine output. 2/9/2023 0654 by Mimi Norris RN  Outcome: Progressing  Flowsheets (Taken 2/9/2023 0654)  Maintains optimal cardiac output and hemodynamic stability:   Monitor blood pressure and heart rate   Monitor urine output and notify Licensed Independent Practitioner for values outside of normal range   Assess for signs of decreased cardiac output     Problem: Safety - Adult  Goal: Free from fall injury  2/9/2023 1944 by Nicole Morgan RN  Outcome: Progressing  Flowsheets (Taken 2/9/2023 1944)  Free From Fall Injury: Instruct family/caregiver on patient safety  Note: Pt is a Fall Risk. See Naye Mclaughlin Fall Risk Score. Pt bed in low position and side rails up. Call light and belongings in reach. Pt encouraged to call for assistance. Will continue with hourly rounds for PO intake, pain needs, toileting, and repositioning as needed.     2/9/2023 0654 by Mimi Norris RN  Outcome: Progressing  Flowsheets (Taken 2/9/2023 0654)  Free From Fall Injury: Instruct family/caregiver on patient safety

## 2023-02-10 NOTE — CARE COORDINATION
Case Management Assessment  Initial Evaluation    Date/Time of Evaluation: 2/10/2023 4:23 PM  Assessment Completed by: Quirino Toledo RN    If patient is discharged prior to next notation, then this note serves as note for discharge by case management. Patient Name: Lauri Prado                   YOB: 1980  Diagnosis: NSTEMI (non-ST elevated myocardial infarction) [facility]) [I21.4]                   Date / Time: 2/8/2023  4:10 PM    Patient Admission Status: Inpatient   Readmission Risk (Low < 19, Mod (19-27), High > 27): Readmission Risk Score: 5.2    Current PCP: No primary care provider on file. PCP verified by CM? Yes    Chart Reviewed: Yes      History Provided by: Patient, Medical Record  Patient Orientation: Alert and Oriented    Patient Cognition: Alert    Hospitalization in the last 30 days (Readmission):  No    If yes, Readmission Assessment in CM Navigator will be completed. Advance Directives:      Code Status: Full Code   Patient's Primary Decision Maker is: Legal Next of Kin      Discharge Planning:    Patient lives with: Alone Type of Home: House  Primary Care Giver: Self  Patient Support Systems include: Family Members   Current Financial resources:    Current community resources:    Current services prior to admission: None            Current DME:              Type of Home Care services:  None    ADLS  Prior functional level: Independent in ADLs/IADLs  Current functional level: Independent in ADLs/IADLs    PT AM-PAC:   /24  OT AM-PAC:   /24    Family can provide assistance at DC: Yes  Would you like Case Management to discuss the discharge plan with any other family members/significant others, and if so, who?  Yes  Plans to Return to Present Housing: Yes  Other Identified Issues/Barriers to RETURNING to current housing: none  Potential Assistance needed at discharge: N/A            Potential DME:    Patient expects to discharge to: [address] [place] [address] for transportation at discharge: Financial    Payor: [employer] / Plan: [employer] / Product Type: *No Product type* /     Does insurance require precert for SNF: Yes    Potential assistance Purchasing Medications: No  Meds-to-Beds request: Yes      [place], [address]   [place] [phone] - F [phone]  [address] [place] [address]  Phone: [phone] Fax: [phone]      Notes:    Factors facilitating achievement of predicted outcomes: Family support, Cooperative, and Pleasant    Barriers to discharge: Medication managment    Additional Case Management Notes: Patient from home, no current DME or [address] prior to admission. Patient plans for return home at [place]. The Plan for Transition of Care is related to the following treatment goals of NSTEMI (non-ST elevated myocardial infarction) ([facility] [address] [H55.8]    IF APPLICABLE: The Patient and/or patient representative Elle Lucero and his family were provided with a choice of provider and agrees with the discharge plan. Freedom of choice list with basic dialogue that supports the patient's individualized plan of care/goals and shares the quality data associated with the providers was provided to: Patient   Patient Representative Name:       The Patient and/or Patient Representative Agree with the Discharge Plan?  Yes    Evangelina Antony RN  Case Management Department  Ph: [phone] Fax: [phone]

## 2023-02-10 NOTE — PROGRESS NOTES
HOSPITALISTS PROGRESS NOTE    2/10/2023 3:59 PM        Name: Michael Kemp . Admitted: 2/8/2023  Primary Care Provider: No primary care provider on file. (Tel: None)      Problem List  Active Problems:    NSTEMI (non-ST elevated myocardial infarction) ([facility] [address]    Acute respiratory failure with hypoxemia (HCC)    Mixed hyperlipidemia  Resolved Problems:    * No resolved hospital problems. *       Assessment & Plan:   Persistent hypoxia  Patient admitted to significant diuresis since yesterday, patient CT PE done at [facility] was negative, Cardiology Did and had normal coronaries and end-diastolic pressures were also on the lower side, given unexplained hypoxia pulmonary recommendation is recommended we did consult pulmonary,  We will check urine drug screen        NSTEMI  S/p cath   Asa/statin heparin drip, cardiology consulted  Coronaries normal except possible Distal D3 occlusion,  will check urine drug screen,    Apical thrombi  Heparin drip    Concerns of heart failure on outside hospital  Follow-up on echocardiogram    Patient does mention that he was on dialysis for about 6 months, renal function stable    Tobacco abuse    IV Access: Peripheral  Nunez: No  Diet: Diet NPO  Code:Full Code  DVT PPX heparin  Disposition monitor in the hospital      Brief Msfazo44 y.o. male Michael Kemp history of PENG needing dialysis for 6 months, nicotine dependence 1 pack/day x 20 years presented to [facility] on 2/6 with complains of shortness of breath worse with exertion. 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  Patient's d-dimer is greatly elevated so a CT anterior of the chest was performed which was negative for PE but did confirm bilateral airspace disease. Initially was planning to transfer to [facility].  02/08 morning, had an episode of diaphoresis, repeat troponin was 0.87, EKG shows inferior and V4 to V6 T wave inversions, patient was transferred to [facility]. for further management of NSTEMI. CC: Chest pain    Subjective:  .   Patient waiting for his cath, it has to be canceled yesterday as patient became hypoxic on lying flat, patient get shows normal coronaries, patient did had a significant low EF,  Discussed with cardiology who feels given his low diastolic pressure and still hypoxic needs a pulmonary eval    Reviewed interval ancillary notes    Current Medications  [START ON 2/11/2023] metoprolol succinate (TOPROL XL) extended release tablet 25 mg, Daily  sodium chloride flush 0.9 % injection 5-40 mL, 2 times per day  sodium chloride flush 0.9 % injection 5-40 mL, PRN  0.9 % sodium chloride infusion, PRN  ondansetron (ZOFRAN-ODT) disintegrating tablet 4 mg, Q8H PRN   Or  ondansetron (ZOFRAN) injection 4 mg, Q6H PRN  acetaminophen (TYLENOL) tablet 650 mg, Q6H PRN   Or  acetaminophen (TYLENOL) suppository 650 mg, Q6H PRN  polyethylene glycol (GLYCOLAX) packet 17 g, Daily PRN  aspirin chewable tablet 81 mg, Daily  rosuvastatin (CRESTOR) tablet 40 mg, Nightly  heparin (porcine) injection 4,000 Units, PRN  heparin (porcine) injection 2,000 Units, PRN  heparin 25,000 units in dextrose 5% 250 mL (premix) infusion, Continuous      Objective:  /70   Pulse (!) 107   Temp 98.9 °F (37.2 °C) (Oral)   Resp 20   Ht 5' 11\" (1.803 m)   Wt 209 lb 14.1 oz (95.2 kg)   SpO2 97%   BMI 29.27 kg/m²     Intake/Output Summary (Last 24 hours) at 2/10/2023 1559  Last data filed at 2/10/2023 1510  Gross per 24 hour   Intake 700 ml   Output 5550 ml   Net -4850 ml      Wt Readings from Last 3 Encounters:   02/10/23 209 lb 14.1 oz (95.2 kg) Alert awake oriented x3  Good diuresis of about 4 L  S1-S2 heard  No significant pedal edema  Neuro mental eyes moist  Lungs are clear I do not hear any crackles now    Labs and Tests:  CBC:   Recent Labs     02/09/23  0649 02/10/23  0447 02/10/23  1050   WBC 8.8 9.7 9.1   HGB 14.9 15.4 16.2    203 217     BMP:    Recent Labs     02/08/23  1825 02/10/23  0447 02/10/23  1050    139 138   K 4.5 3.7 3.9    94* 93*   CO2 32 36* 36*   BUN 12 9 8   CREATININE 0.8* 0.9 0.8*   GLUCOSE 90 85 108*     Hepatic:   Recent Labs     02/08/23  1825 02/10/23  1050   AST 24 22   ALT 20 17   BILITOT 0.5 0.7   ALKPHOS 93 109     XR CHEST (2 VW)   Final Result   1. No evidence of acute cardiopulmonary disease. XR CHEST 1 VIEW   Final Result   1. There is some hazy groundglass opacity seen projecting over the right midlung. This may reflect some overlying soft tissue attenuation related to rotated patient positioning. Asymmetric pulmonary edema or pneumonitis is a differential consideration. This could be better characterized with dedicated PA and lateral chest radiograph.                     El Mcgill MD   2/10/2023 3:59 PM

## 2023-02-10 NOTE — PROGRESS NOTES
CC: chest pain   HPI:    Edin Frazier is a 40 y.o. male with a past medical history of hypertension, PENG?, tobacco use who was transferred from [facility] with complaint of chest pain. Patient apparently has been having intermittent chest discomfort for over a month. At some point the pain was fairly constant and he decided to present to [facility] for further evaluation. His troponin was elevated there. The plan was to transfer the patient to [facility] for possible cardiac catheterization. Unfortunately, this did not happen for 2 days bed availability. Currently pain-free      S: Denies cp, sob or orthopnea. HOB minimally elevated. Remains on 4 liter oxygen. Tele: Sinus tach    O:  Physical Exam:  /74   Pulse (!) 104   Temp 97.6 °F (36.4 °C) (Oral)   Resp 16   Ht 5' 11\" (1.803 m)   Wt 209 lb 14.1 oz (95.2 kg)   SpO2 93%   BMI 29.27 kg/m²    General (appearance):  No acute distress  Eyes: anicteric   Neck: soft, No JVD  Ears/Nose/Mouth/Thorat: No cyanosis  CV: Reg, tach   Respiratory:  diminished, normal effort  GI: soft, non-tender, non-distended  Skin: Warm, dry. No rashes  Neuro/Psych: Alert and oriented x 3. Appropriate behavior  Ext:  No c/c. No edema  Pulses:  2+ radial     I.O's= -7.6    Weight  Admission: Weight: 210 lb 1.6 oz (95.3 kg)   Today: Weight: 209 lb 14.1 oz (95.2 kg)    CBC:   Recent Labs     02/08/23  1826 02/09/23  0649 02/10/23  0447   WBC 7.6 8.8 9.7   HGB 14.5 14.9 15.4   HCT 44.3 45.3 46.9   MCV 92.2 93.1 94.6    205 203     BMP:   Recent Labs     02/08/23  1825      K 4.5      CO2 32   BUN 12   CREATININE 0.8*     Estimated Creatinine Clearance: 143 mL/min (A) (based on SCr of 0.8 mg/dL (L)).     Mag: No results found for: MG  LIVER PROFILE:   Recent Labs     02/08/23  1825   AST 24   ALT 20   BILITOT 0.5   ALKPHOS 93     PT/INR:   Recent Labs     02/08/23  1825   PROTIME 13.2   INR 1.01     Pro-BNP: No results found for: PROBNP  CARDIAC ENZYMES:   Recent Labs     23  1825   TROPONINI 0.38*  0.37*         [id number] Echo   Severe Left ventricular enlargement. Severely decreased left ventricular systolic function with an estimated ejection fraction of <20%. Global hypokinesis. Abnormal diastolic filling pattern. There appear to be multiple thrombi within the left ventricular apex. The mitral valve is normal in structure. Mild mitral regurgitation. No evidence of mitral stenosis. The left atrium is dilated. Thickened aortic valve leaflets. There is no significant aortic valve regurgitation or stenosis. The aortic root is mildly dilated measuring 3.8 cm. The right ventricle appears dilated with decreased systolic function. Tapse: 2.09 cm. RV s: 12.2 cm/s   The tricuspid valve is normal in structure. Mild tricuspid regurgitation. No evidence of tricuspid stenosis. IVC size is normal (<2.1cm) and collapses > 50% with respiration consistent   with normal RA pressure (3mmHg). Estimated pulmonary artery systolic pressure is at 45 mmHg assuming a right   atrial pressure of 3 mmHg. Definity contrast agent was used to help visualize endocardial borders    CXR  . There is some hazy groundglass opacity seen projecting over the right midlung. This may reflect some overlying soft tissue attenuation related to rotated patient positioning. Asymmetric pulmonary edema or pneumonitis is a differential consideration. This could be better characterized with dedicated PA and lateral chest radiograph     Assessment:  40 y.o. with chest pain  Issues:  -NSTEMI  -Hypoxemic respiratory failure  -PENG  -LV dysfunction   -LV thrombi     Plan:  -Right and left heart cath planned for this afternoon if pt can lay flat   -Heparin gtt   -Crestor  -ASA  -Toprol increased to 25 mg po daily   -Lasix IV BID  -CBC, CMP daily    Will attempt to start Entresto, MRA and spirolactone at discharge.  Concern BP may not tolerate entresto. He is normotensive.

## 2023-02-10 NOTE — PRE SEDATION
Sedation Pre-Procedure Note    Patient Name: Diane Mitchell   YOB: 1980  Room/Bed: 5437/2975-21  Medical Record Number: [id number]  Date: 2/10/2023   Time: 3:03 PM       Indication:  Cardiomyopathy    Consent: I have discussed with the patient and/or the patient representative the indication, alternatives, and the possible risks and/or complications of the planned procedure and the anesthesia methods. The patient and/or patient representative appear to understand and agree to proceed. Vital Signs:   Vitals:    02/10/23 1400   BP:    Pulse: (!) 112   Resp:    Temp:    SpO2:        Past Medical History:   has a past medical history of Chronic kidney disease (CKD), Hemodialysis patient ([facility] [address] and Hypertension. Past Surgical History:   has no past surgical history on file. Medications:   Scheduled Meds:    [START ON 2/11/2023] metoprolol succinate  25 mg Oral Daily    [Held by provider] furosemide  40 mg IntraVENous BID    sodium chloride flush  5-40 mL IntraVENous 2 times per day    aspirin  81 mg Oral Daily    rosuvastatin  40 mg Oral Nightly    [Held by provider] nitroglycerin  0.5 inch Topical 4 times per day     Continuous Infusions:    sodium chloride      heparin (PORCINE) Infusion 5 Units/kg/hr (02/10/23 1348)     PRN Meds: sodium chloride flush, sodium chloride, ondansetron **OR** ondansetron, acetaminophen **OR** acetaminophen, polyethylene glycol, heparin (porcine), heparin (porcine)  Home Meds:   Prior to Admission medications    Medication Sig Start Date End Date Taking? Authorizing Provider   amLODIPine (NORVASC) 5 MG tablet Take 5 mg by mouth daily   Yes Historical Provider, MD   ibuprofen (IBU) 600 MG tablet Take 1 tablet by mouth every 6 hours as needed for Pain for 20 doses.  12/4/13   Major MD Linda     Coumadin Use Last 7 Days:  no  Antiplatelet drug therapy use last 7 days: yes   Other anticoagulant use last 7 days: yes - heparin gtt  Additional Medication Information: Pre-Sedation Documentation and Exam:   I have personally completed a history, physical exam & review of systems for this patient (see notes).     Mallampati Airway Assessment:  Mallampati Class II - (soft palate, fauces & uvula are visible)    Prior History of Anesthesia Complications:   none    ASA Classification:  Class 4 - A patient with an incapacitating systemic disease that is a constant threat to life    Sedation/ Anesthesia Plan:   intravenous sedation    Medications Planned:   midazolam (Versed) intravenously and fentanyl intravenously    Patient is an appropriate candidate for plan of sedation: yes

## 2023-02-10 NOTE — CONSULTS
Pulmonology Consult Note    PCP: No primary care provider on file. Code:Full Code  Admit Date: 2/8/2023  Diet: Diet NPO      History of present illness:      CC: SOB    Patient is a 40 y.o. male with a PMHx of nicotine dependence 1 pack/day for the past 20 years, PENG needing dialysis 6 years ago presenting to [facility] on 02/06 with chief complain of shortness of breath. 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 However, he started to have shortness of breath worse with exertion. 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 He SOB got so bad that he was not able to walk from his bed to his door. He denies having chest pain, cough, N/V, abdominal pain, constipation, LE edema. He denies having sick contact. Due to worsening SOB, he decided to present to ED. Patient mentions that he had URI (most likely Covid but not tested. His friend test positive with Covid at that time) last November. In the ED, EKG showed inferior lateral ST depressions, troponin 1.8, proBNP 5800, he was needing high flow oxygen placed on BiPAP given IV diuresis and placed on heparin drip. Patient's d-dimer is greatly elevated so a CT anterior of the chest was performed which was negative for PE but did confirm bilateral airspace disease. Initially was planning to transfer to . But on 02/08 morning, had an episode of diaphoresis, repeat troponin was 0.87, EKG shows inferior and V4 to V6 T wave inversions, patient was transferred to [facility], [facility]. for further management of NSTEMI. Patient was started on lasix. Echo was done and showed EF <20% and abnormal diastolic filling pattern. Left heart cath and right heart cath showed normal coronaries. Pulmonology was consulted for persistent hypoxia after diuresis. ROS: Review of Systems -   All other systems reviewed and are negative.     Past Medical / Surgical History:    Past Medical History:   Diagnosis Date    Chronic kidney disease (CKD) 02/01/2018    Estimated by patient    Hemodialysis patient ([address]     Reports 190 days of 3x week    Hypertension      No past surgical history on file. Medications Prior to Admission:    No current facility-administered medications on file prior to encounter. Current Outpatient Medications on File Prior to Encounter   Medication Sig Dispense Refill    amLODIPine (NORVASC) 5 MG tablet Take 5 mg by mouth daily      ibuprofen (IBU) 600 MG tablet Take 1 tablet by mouth every 6 hours as needed for Pain for 20 doses. 20 tablet 0       Allergies:  Patient has no known allergies. Social History:   TOBACCO:   reports that he has been smoking cigarettes. He has a 22.00 pack-year smoking history. He has been exposed to tobacco smoke. He has never used smokeless tobacco.       ETOH:   reports no history of alcohol use. Patient currently lives with family. Family History:   No family history on file. Vital/I&O/Physical examination:   VS:  /73   Pulse (!) 104   Temp 98.9 °F (37.2 °C) (Oral)   Resp 20   Ht 5' 11\" (1.803 m)   Wt 209 lb 14.1 oz (95.2 kg)   SpO2 90%   BMI 29.27 kg/m²     I/O:    Intake/Output Summary (Last 24 hours) at 2/10/2023 1515  Last data filed at 2/10/2023 1510  Gross per 24 hour   Intake 700 ml   Output 6500 ml   Net -5800 ml       PE:  Physical Exam  Vitals reviewed. Constitutional:       Appearance: Normal appearance. HENT:      Head: Normocephalic and atraumatic. Nose: Nose normal.      Mouth/Throat:      Mouth: Mucous membranes are moist.   Eyes:      Extraocular Movements: Extraocular movements intact. Conjunctiva/sclera: Conjunctivae normal.      Pupils: Pupils are equal, round, and reactive to light. Cardiovascular:      Rate and Rhythm: Normal rate and regular rhythm. Pulses: Normal pulses. Heart sounds: Normal heart sounds. Pulmonary:      Effort: Pulmonary effort is normal.      Breath sounds: Normal breath sounds. Abdominal:      Palpations: Abdomen is soft. Musculoskeletal:         General: Normal range of motion. Cervical back: Normal range of motion and neck supple. Neurological:      General: No focal deficit present. Mental Status: He is alert and oriented to person, place, and time. Labs & Imaging:   LABS:  CBC:   Recent Labs     02/09/23  0649 02/10/23  0447 02/10/23  1050   WBC 8.8 9.7 9.1   HGB 14.9 15.4 16.2   HCT 45.3 46.9 48.7    203 217   MCV 93.1 94.6 91.6                            Renal:   Recent Labs     02/08/23  1825 02/10/23  0447 02/10/23  1050    139 138   K 4.5 3.7 3.9    94* 93*   CO2 32 36* 36*   BUN 12 9 8   CREATININE 0.8* 0.9 0.8*   GLUCOSE 90 85 108*   ANIONGAP 9 9 9     Hepatic:   Recent Labs     02/08/23  1825 02/10/23  1050   AST 24 22   ALT 20 17   BILITOT 0.5 0.7   ALKPHOS 93 109     Troponin:   Recent Labs     02/08/23  1825   TROPONINI 0.38*  0.37*     BNP: No results for input(s): BNP in the last 72 hours. Lipids:   Recent Labs     02/09/23  0648   CHOL 155   HDL 25*     INR:   Recent Labs     02/08/23  1825   INR 1.01     Lactate: No results for input(s): LACTATE in the last 72 hours. ABGs:No results for input(s): PHART, GYX1RLY, PO2ART, ZJS8MFS, BEART, THGBART, H5BAKUCR, WVR4ABX in the last 72 hours. UA:No results for input(s): NITRITE, COLORU, PHUR, LABCAST, WBCUA, RBCUA, MUCUS, TRICHOMONAS, YEAST, BACTERIA, CLARITYU, SPECGRAV, LEUKOCYTESUR, UROBILINOGEN, BILIRUBINUR, BLOODU, GLUCOSEU, AMORPHOUS in the last 72 hours. Invalid input(s): KETONESU     IMAGING:  XR CHEST (2 VW)   Final Result   1. No evidence of acute cardiopulmonary disease. XR CHEST 1 VIEW   Final Result   1. There is some hazy groundglass opacity seen projecting over the right midlung. This may reflect some overlying soft tissue attenuation related to rotated patient positioning. Asymmetric pulmonary edema or pneumonitis is a differential consideration.     This could be better characterized with dedicated PA and lateral chest radiograph. Assessment & Plan:    Jia Simon is a 40 y.o. male with PMHx significant for nicotine dependence 1 pack/day for the past 20 years, PENG needing dialysis 6 years ago presenting to [facility] on 02/06 with chief complain of shortness of breath. Acute hypoxic respiratory failure:  CHF exacerbation:  Volume overload:  - Patient presented with SOB. - Started on lasix. Echo showed  EF <20% and abnormal diastolic filling pattern. Left heart cath and right heart cath showed normal coronaries. - Chest X ray showed no evidence of acute cardiopulmonary disease.  - Patient was on room with SpO2 in low 90s. - No acute pulmonary process. His SOB is related to his CHF.  - Cardiology on board. - Strict I/O  - Daily weight  - Pulm will sign off. Call for questions. Tobacco use:  Patient has 20 pack smoking history. He lives with his mother who also smoke. - Advised patient on smoking cessation        Code Status: Full Code  Diet NPO      This patient will be discussed with attending, Dr. Eileen Smith MD.    Anabela Stephenson MD MD, PGY- 3  Contact via [facility]  2/10/2023,  3:15 PM     Patient seen, examined and discussed with the resident and I agree with the assessment and plan. Briefly, this is a 40 y.o. male with NICM and hypoxia    Patient has had a fairly rapid decline, going from an active, self employed  to short of breath walking a couple of feet. Workup has revealed severe biventricular heart failure with a LVEF of <20%. He had a left and right hear cath that showed no coronary artery disease and no pulmonary hypertension. His cardiac output and index were reported as normal as well, which is a little surprising to me as he's not tachycardic, at rest.    I was consulted for persistent hypoxia despite diuresis.   Patient denies and preceding lung issues despite smoking a pack per day for the past 20 years. 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 For my evaluation he had a pulse oximeter on and 3.5L of oxygen. The waveform on the pulse oximeter was relatively flat, but he was registering saturations of %. I gradually turned the oxygen down as I spoke with him about his symptoms coming in and his profession etc.  I eventually weaned him off oxygen completely and continued to speak with him for another 5-10 minutes. He had one saturation of 89%, but it was when he moved his hand and the probe came off. When repositioned he was saturating 95%. I do not suspect that this patient has any significant lung disease and believe that what hypoxia he has is related to his severe NICM. I expect that he will become dyspneic and hypoxic with exertion until his cardiac function improves. I would prefer that he discharge on oxygen, unless he's well above 88% at all times as I suspect it will be difficult to adjust to the lifestyle changes associated with his severely decreased EF and having oxygen available when he needs it could help his recovery and prevent hospitalizations. Please call if pulmonary can be of any further assistance.     Raynold Lesches, MD

## 2023-02-10 NOTE — PLAN OF CARE
Problem: Respiratory - Adult  Goal: Achieves optimal ventilation and oxygenation  Outcome: Progressing  Flowsheets (Taken 2/10/2023 1316)  Achieves optimal ventilation and oxygenation:   Assess for changes in respiratory status   Assess for changes in mentation and behavior   Oxygen supplementation based on oxygen saturation or arterial blood gases   Assess the need for suctioning and aspirate as needed   Assess and instruct to report shortness of breath or any respiratory difficulty   Position to facilitate oxygenation and minimize respiratory effort  Note: Pt on bedrest.  Alert and oriented x4. SpO2 > 89% on 4 L O2. Desaturation occurs when lying flat. Pt positioned to facilitate oxygenation. Problem: Cardiovascular - Adult  Goal: Maintains optimal cardiac output and hemodynamic stability  Outcome: Progressing  Flowsheets (Taken 2/10/2023 1316)  Maintains optimal cardiac output and hemodynamic stability:   Monitor blood pressure and heart rate   Monitor urine output and notify Licensed Independent Practitioner for values outside of normal range   Assess for signs of decreased cardiac output  Note: Pt sinus tach on cont telemetry. Urine output adequate. Blood pressure WNL. Peripheral pulses +2. Problem: Safety - Adult  Goal: Free from fall injury  Outcome: Progressing  Flowsheets (Taken 2/10/2023 1320)  Free From Fall Injury: Instruct family/caregiver on patient safety  Note: Pt is a Fall Risk. See Giulia Ruth Fall Risk Score. Pt bed in low position and 2 side rails up. Call light and belongings in reach. Pt encouraged to call for assistance. Will continue with hourly rounds for PO intake, pain needs, toileting, and repositioning as needed.

## 2023-02-11 ENCOUNTER — APPOINTMENT (OUTPATIENT)
Dept: CT IMAGING | Age: 43
DRG: 280 | End: 2023-02-11
Attending: INTERNAL MEDICINE
Payer: COMMERCIAL

## 2023-02-11 LAB
A/G RATIO: 1.1 (ref 1.1–2.2)
ALBUMIN SERPL-MCNC: 3.6 G/DL (ref 3.4–5)
ALP BLD-CCNC: 98 U/L (ref 40–129)
ALT SERPL-CCNC: 16 U/L (ref 10–40)
ANION GAP SERPL CALCULATED.3IONS-SCNC: 5 MMOL/L (ref 3–16)
AST SERPL-CCNC: 22 U/L (ref 15–37)
BASOPHILS ABSOLUTE: 0 K/UL (ref 0–0.2)
BASOPHILS RELATIVE PERCENT: 0.6 %
BILIRUB SERPL-MCNC: 0.8 MG/DL (ref 0–1)
BUN BLDV-MCNC: 14 MG/DL (ref 7–20)
CALCIUM SERPL-MCNC: 9.5 MG/DL (ref 8.3–10.6)
CHLORIDE BLD-SCNC: 95 MMOL/L (ref 99–110)
CO2: 39 MMOL/L (ref 21–32)
CREAT SERPL-MCNC: 1.1 MG/DL (ref 0.9–1.3)
EOSINOPHILS ABSOLUTE: 0.2 K/UL (ref 0–0.6)
EOSINOPHILS RELATIVE PERCENT: 2.7 %
GFR SERPL CREATININE-BSD FRML MDRD: >60 ML/MIN/{1.73_M2}
GLUCOSE BLD-MCNC: 92 MG/DL (ref 70–99)
HCT VFR BLD CALC: 47.1 % (ref 40.5–52.5)
HEMOGLOBIN: 15.8 G/DL (ref 13.5–17.5)
LYMPHOCYTES ABSOLUTE: 1.5 K/UL (ref 1–5.1)
LYMPHOCYTES RELATIVE PERCENT: 17.9 %
MCH RBC QN AUTO: 30.7 PG (ref 26–34)
MCHC RBC AUTO-ENTMCNC: 33.5 G/DL (ref 31–36)
MCV RBC AUTO: 91.6 FL (ref 80–100)
MONOCYTES ABSOLUTE: 0.5 K/UL (ref 0–1.3)
MONOCYTES RELATIVE PERCENT: 6.5 %
NEUTROPHILS ABSOLUTE: 6 K/UL (ref 1.7–7.7)
NEUTROPHILS RELATIVE PERCENT: 72.3 %
PDW BLD-RTO: 14.9 % (ref 12.4–15.4)
PLATELET # BLD: 197 K/UL (ref 135–450)
PMV BLD AUTO: 8.9 FL (ref 5–10.5)
POTASSIUM REFLEX MAGNESIUM: 3.9 MMOL/L (ref 3.5–5.1)
RBC # BLD: 5.14 M/UL (ref 4.2–5.9)
SODIUM BLD-SCNC: 139 MMOL/L (ref 136–145)
TOTAL PROTEIN: 6.8 G/DL (ref 6.4–8.2)
WBC # BLD: 8.2 K/UL (ref 4–11)

## 2023-02-11 PROCEDURE — 6370000000 HC RX 637 (ALT 250 FOR IP): Performed by: INTERNAL MEDICINE

## 2023-02-11 PROCEDURE — 2060000000 HC ICU INTERMEDIATE R&B

## 2023-02-11 PROCEDURE — 85025 COMPLETE CBC W/AUTO DIFF WBC: CPT

## 2023-02-11 PROCEDURE — 80053 COMPREHEN METABOLIC PANEL: CPT

## 2023-02-11 PROCEDURE — 2580000003 HC RX 258: Performed by: INTERNAL MEDICINE

## 2023-02-11 PROCEDURE — 99233 SBSQ HOSP IP/OBS HIGH 50: CPT | Performed by: INTERNAL MEDICINE

## 2023-02-11 PROCEDURE — 6370000000 HC RX 637 (ALT 250 FOR IP): Performed by: NURSE PRACTITIONER

## 2023-02-11 PROCEDURE — 36415 COLL VENOUS BLD VENIPUNCTURE: CPT

## 2023-02-11 PROCEDURE — 71250 CT THORAX DX C-: CPT

## 2023-02-11 RX ORDER — FUROSEMIDE 40 MG/1
40 TABLET ORAL DAILY
Status: DISCONTINUED | OUTPATIENT
Start: 2023-02-11 | End: 2023-02-15 | Stop reason: HOSPADM

## 2023-02-11 RX ORDER — LISINOPRIL 2.5 MG/1
2.5 TABLET ORAL DAILY
Status: DISCONTINUED | OUTPATIENT
Start: 2023-02-11 | End: 2023-02-15 | Stop reason: HOSPADM

## 2023-02-11 RX ADMIN — APIXABAN 10 MG: 5 TABLET, FILM COATED ORAL at 20:05

## 2023-02-11 RX ADMIN — SODIUM CHLORIDE, PRESERVATIVE FREE 10 ML: 5 INJECTION INTRAVENOUS at 20:05

## 2023-02-11 RX ADMIN — SODIUM CHLORIDE, PRESERVATIVE FREE 10 ML: 5 INJECTION INTRAVENOUS at 09:33

## 2023-02-11 RX ADMIN — ROSUVASTATIN 40 MG: 20 TABLET, FILM COATED ORAL at 20:04

## 2023-02-11 RX ADMIN — METOPROLOL SUCCINATE 25 MG: 25 TABLET, FILM COATED, EXTENDED RELEASE ORAL at 09:29

## 2023-02-11 RX ADMIN — APIXABAN 10 MG: 5 TABLET, FILM COATED ORAL at 09:28

## 2023-02-11 RX ADMIN — SODIUM CHLORIDE, PRESERVATIVE FREE 10 ML: 5 INJECTION INTRAVENOUS at 09:29

## 2023-02-11 RX ADMIN — FUROSEMIDE 40 MG: 40 TABLET ORAL at 09:29

## 2023-02-11 RX ADMIN — ASPIRIN 81 MG 81 MG: 81 TABLET ORAL at 09:28

## 2023-02-11 RX ADMIN — LISINOPRIL 2.5 MG: 2.5 TABLET ORAL at 09:29

## 2023-02-11 ASSESSMENT — PAIN SCALES - GENERAL
PAINLEVEL_OUTOF10: 0
PAINLEVEL_OUTOF10: 0

## 2023-02-11 NOTE — PROGRESS NOTES
[facility] Daily Progress Note      Admit Date:  2/8/2023    Subjective:  Mr. Maryann Salas was seen and examined. F/U CHF/Cardiomyop/LV thrombus/Non st.  No complaints overnight. No sob. No chest pain. Breathing may be some better.      Objective:   /70   Pulse (!) 102   Temp 97.9 °F (36.6 °C) (Oral)   Resp 18   Ht 5' 11\" (1.803 m)   Wt 205 lb 7.5 oz (93.2 kg)   SpO2 92%   BMI 28.66 kg/m²     Intake/Output Summary (Last 24 hours) at 2/11/2023 0746  Last data filed at 2/11/2023 5464  Gross per 24 hour   Intake 480 ml   Output 3150 ml   Net -2670 ml       TELEMETRY: Sinus     Physical Exam:  General:  Awake, alert, NAD  Skin:  Warm and dry  Neck:  JVP difficult  Chest:  decreased BS, respiration normal  Cardiovascular:  RRR S1S2  Abdomen:  Soft nontender  Extremities:  no edema    Medications:    metoprolol succinate  25 mg Oral Daily    sodium chloride flush  5-40 mL IntraVENous 2 times per day    apixaban  10 mg Oral BID    Followed by    Juan Yousif ON 2/17/2023] apixaban  5 mg Oral BID    sodium chloride flush  5-40 mL IntraVENous 2 times per day    aspirin  81 mg Oral Daily    rosuvastatin  40 mg Oral Nightly      sodium chloride       sodium chloride flush, acetaminophen, sodium chloride flush, sodium chloride, ondansetron **OR** ondansetron, polyethylene glycol    Lab Data:  CBC:   Recent Labs     02/10/23  0447 02/10/23  1050 02/11/23  0518   WBC 9.7 9.1 8.2   HGB 15.4 16.2 15.8   HCT 46.9 48.7 47.1   MCV 94.6 91.6 91.6    217 197     BMP:   Recent Labs     02/10/23  0447 02/10/23  1050 02/11/23  0518    138 139   K 3.7 3.9 3.9   CL 94* 93* 95*   CO2 36* 36* 39*   BUN 9 8 14   CREATININE 0.9 0.8* 1.1     LIVER PROFILE:   Recent Labs     02/08/23  1825 02/10/23  1050 02/11/23  0518   AST 24 22 22   ALT 20 17 16   BILITOT 0.5 0.7 0.8   ALKPHOS 93 109 98     PT/INR:   Recent Labs     02/08/23  1825   PROTIME 13.2   INR 1.01     APTT:   Recent Labs     02/08/23  1825   APTT 24.0     BNP: No results for input(s): BNP in the last 72 hours. IMAGING:     Assessment:  Patient Active Problem List    Diagnosis Date Noted    NICM [facility]nonischemic cardiomyopathy) [facility] 02/10/2023    Acute decompensated heart failure ([facility] 02/10/2023    Acute respiratory failure with hypoxemia ([facility] 02/09/2023    Mixed hyperlipidemia 02/09/2023    NSTEMI (non-ST elevated myocardial infarction) ([facility] 02/08/2023       Plan:  No complaints. Breathing may be  some better. Negative fluid balance. Will continue diuresis since still requiring significant O2. Now on eliquis. Will start ACE. Wean O2 as tolerated. Pulm evaluation noted.        Core Measures:  Discharge instructions:   LVEF documented:   ACEI for LV dysfunction:   Smoking Cessation:    Chester Fonseca MD, MD 2/11/2023 7:46 AM

## 2023-02-11 NOTE — PROGRESS NOTES
Hospitalist Progress Note      Name:  Raymond Gordon /Age/Sex: 1980  (40 y.o. male)   MRN & CSN:  [id number] & [id number] Admission Date/Time: 2023  4:10 PM   Location:  [facility]/9553-86 PCP: No primary care provider on file. Hospital Day: 4    Assessment and Plan:   [id number] y.o. male Raymond Gordon history of PENG needing dialysis for 6 months, nicotine dependence 1 pack/day x 20 years presented to [facility] on  with complains of shortness of breath worse with exertion. 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 Patient's d-dimer is greatly elevated so a CT anterior of the chest was performed which was negative for PE but did confirm bilateral airspace disease. Initially was planning to transfer to [facility].   morning, had an episode of diaphoresis, repeat troponin was 0.87, EKG shows inferior and V4 to V6 T wave inversions, patient was transferred to [facility]. for further management of NSTEMI.     Persistent hypoxia  Patient admitted to significant diuresis since yesterday, patient CT PE done at [facility] was negative, Cardiology Did heart cath , has normal coronaries and end-diastolic pressures were also on the lower side, given unexplained hypoxia pulmonary recommendation is recommended we did consult pulmonary,  check urine drug screen positive to benzo   Order CT chest today            NSTEMI  S/p cath   Asa/statin heparin drip, cardiology consulted  Coronaries normal except possible Distal D3 occlusion,  will check urine drug screen,     Apical thrombi  Was Heparin drip  Switched to elquis      Concerns of heart failure on outside hospital  Follow-up on echocardiogram  On oral lasix      Patient does mention that he was on dialysis for about 6 months, renal function stable    Tobacco abuse    Diet ADULT DIET; Regular; Low Fat/Low Chol/High Fiber/2 gm Na   DVT Prophylaxis [] Lovenox, []  Heparin, [] SCDs, [] Ambulation   GI Prophylaxis [] PPI,  [] H2 Blocker,  [] Carafate,  [] Diet/Tube Feeds   Code Status Full Code   Disposition Patient requires continued admission due to    MDM [] Low, [] Moderate,[]  High  Patient's risk as above due to      History of Present Illness:   Was seen and examined  Still on 4 L O2  Order CT chest to r/o acute pathology  Has sinus tachycardia on BB    Objective: Intake/Output Summary (Last 24 hours) at 2/11/2023 1039  Last data filed at 2/11/2023 0928  Gross per 24 hour   Intake 720 ml   Output 1725 ml   Net -1005 ml      Vitals:   Vitals:    02/11/23 0917   BP: 103/72   Pulse: (!) 112   Resp: 17   Temp: 97.9 °F (36.6 °C)   SpO2: 96%     Physical Exam:   GEN Awake.  Alert , not in respiratory distress, not in pain  HEENT: PEERLA, , supple neck,   Chest: air entry equal bilaterally, no wheezing or crepitation, decrease breathing bilaterally   Heart: S1 and S2 heard, no murmur, no gallop or rub, regular rate  Abdomen: soft, ND , Nt, +BS  Extremities: no cyanosis, tenderness or erythema, peripheral pulses audible  Neurology: alert, oriented x3, able to move 4 limbs    Medications:   Medications:    lisinopril  2.5 mg Oral Daily    furosemide  40 mg Oral Daily    metoprolol succinate  25 mg Oral Daily    sodium chloride flush  5-40 mL IntraVENous 2 times per day    apixaban  10 mg Oral BID    Followed by    Mitch Robertson ON 2/17/2023] apixaban  5 mg Oral BID    sodium chloride flush  5-40 mL IntraVENous 2 times per day    aspirin  81 mg Oral Daily    rosuvastatin  40 mg Oral Nightly      Infusions:    sodium chloride       PRN Meds: sodium chloride flush, 5-40 mL, PRN  acetaminophen, 650 mg, Q4H PRN  sodium chloride flush, 5-40 mL, PRN  sodium chloride, , PRN  ondansetron, 4 mg, Q8H PRN   Or  ondansetron, 4 mg, Q6H PRN  polyethylene glycol, 17 g, Daily PRN          Electronically signed by Jannette Mnae MD on 2/11/2023 at 10:39 AM

## 2023-02-11 NOTE — PLAN OF CARE
Problem: Discharge Planning  Goal: Discharge to home or other facility with appropriate resources  2/11/2023 0941 by Tico Matos RN  Outcome: Progressing  Flowsheets (Taken 2/11/2023 1241)  Discharge to home or other facility with appropriate resources:   Identify barriers to discharge with patient and caregiver   Arrange for needed discharge resources and transportation as appropriate   Identify discharge learning needs (meds, wound care, etc)     Problem: Respiratory - Adult  Goal: Achieves optimal ventilation and oxygenation  2/11/2023 0941 by Tico Matos RN  Outcome: Progressing  Flowsheets (Taken 2/11/2023 0941)  Achieves optimal ventilation and oxygenation:   Assess for changes in respiratory status   Assess for changes in mentation and behavior   Position to facilitate oxygenation and minimize respiratory effort   Oxygen supplementation based on oxygen saturation or arterial blood gases   Initiate smoking cessation protocol as indicated   Encourage broncho-pulmonary hygiene including cough, deep breathe, incentive spirometry     Problem: Cardiovascular - Adult  Goal: Maintains optimal cardiac output and hemodynamic stability  2/11/2023 0941 by Tico Matos RN  Outcome: Progressing  Flowsheets (Taken 2/11/2023 0941)  Maintains optimal cardiac output and hemodynamic stability:   Monitor blood pressure and heart rate   Monitor urine output and notify Licensed Independent Practitioner for values outside of normal range   Assess for signs of decreased cardiac output   Administer fluid and/or volume expanders as ordered     Problem: Genitourinary - Adult  Goal: Absence of urinary retention  2/11/2023 0941 by Tico Matos RN  Outcome: Progressing  Flowsheets (Taken 2/11/2023 0941)  Absence of urinary retention:   Assess patients ability to void and empty bladder   Monitor intake/output and perform bladder scan as needed     Problem: Safety - Adult  Goal: Free from fall injury  2/11/2023 0941 by Andre Landaverde RN  Outcome: Progressing  Flowsheets (Taken 2/11/2023 0941)  Free From Fall Injury: Instruct family/caregiver on patient safety     Problem: ABCDS Injury Assessment  Goal: Absence of physical injury  Outcome: Progressing  Flowsheets (Taken 2/11/2023 0941)  Absence of Physical Injury: Implement safety measures based on patient assessment

## 2023-02-11 NOTE — PLAN OF CARE
Problem: Discharge Planning  Goal: Discharge to home or other facility with appropriate resources  Outcome: Progressing  Flowsheets (Taken 2/11/2023 0103)  Discharge to home or other facility with appropriate resources: Identify barriers to discharge with patient and caregiver     Problem: Respiratory - Adult  Goal: Achieves optimal ventilation and oxygenation  2/11/2023 0103 by Winsome Khan RN  Outcome: Progressing  Flowsheets (Taken 2/11/2023 0103)  Achieves optimal ventilation and oxygenation:   Assess for changes in respiratory status   Assess for changes in mentation and behavior   Position to facilitate oxygenation and minimize respiratory effort   Oxygen supplementation based on oxygen saturation or arterial blood gases   Respiratory therapy support as indicated   Assess and instruct to report shortness of breath or any respiratory difficulty   Assess the need for suctioning and aspirate as needed  Note: Pt on 4 L of O2 NC. O2 sat between 90-95%.  No complaints of SOB     Problem: Cardiovascular - Adult  Goal: Maintains optimal cardiac output and hemodynamic stability  2/11/2023 0103 by Winsome Khan RN  Outcome: Progressing  Flowsheets (Taken 2/11/2023 0103)  Maintains optimal cardiac output and hemodynamic stability:   Monitor blood pressure and heart rate   Monitor urine output and notify Licensed Independent Practitioner for values outside of normal range   Assess for signs of decreased cardiac output     Problem: Genitourinary - Adult  Goal: Absence of urinary retention  Outcome: Progressing  Flowsheets (Taken 2/11/2023 0103)  Absence of urinary retention:   Assess patients ability to void and empty bladder   Monitor intake/output and perform bladder scan as needed     Problem: Safety - Adult  Goal: Free from fall injury  2/11/2023 0103 by Winsome Khan RN  Outcome: Progressing  Flowsheets (Taken 2/11/2023 0103)  Free From Fall Injury: Instruct family/caregiver on patient safety  Note: Bed in lowest position with alarm on and call light within reach.

## 2023-02-12 LAB
A/G RATIO: 1.2 (ref 1.1–2.2)
ALBUMIN SERPL-MCNC: 3.8 G/DL (ref 3.4–5)
ALP BLD-CCNC: 98 U/L (ref 40–129)
ALT SERPL-CCNC: 20 U/L (ref 10–40)
ANION GAP SERPL CALCULATED.3IONS-SCNC: 11 MMOL/L (ref 3–16)
AST SERPL-CCNC: 31 U/L (ref 15–37)
BASOPHILS ABSOLUTE: 0 K/UL (ref 0–0.2)
BASOPHILS RELATIVE PERCENT: 0.6 %
BILIRUB SERPL-MCNC: 0.7 MG/DL (ref 0–1)
BUN BLDV-MCNC: 16 MG/DL (ref 7–20)
CALCIUM SERPL-MCNC: 9.3 MG/DL (ref 8.3–10.6)
CHLORIDE BLD-SCNC: 96 MMOL/L (ref 99–110)
CO2: 34 MMOL/L (ref 21–32)
CREAT SERPL-MCNC: 0.9 MG/DL (ref 0.9–1.3)
EOSINOPHILS ABSOLUTE: 0.2 K/UL (ref 0–0.6)
EOSINOPHILS RELATIVE PERCENT: 3 %
GFR SERPL CREATININE-BSD FRML MDRD: >60 ML/MIN/{1.73_M2}
GLUCOSE BLD-MCNC: 83 MG/DL (ref 70–99)
HCT VFR BLD CALC: 48.8 % (ref 40.5–52.5)
HEMOGLOBIN: 16.2 G/DL (ref 13.5–17.5)
LYMPHOCYTES ABSOLUTE: 1.6 K/UL (ref 1–5.1)
LYMPHOCYTES RELATIVE PERCENT: 22.1 %
MCH RBC QN AUTO: 30.7 PG (ref 26–34)
MCHC RBC AUTO-ENTMCNC: 33.2 G/DL (ref 31–36)
MCV RBC AUTO: 92.3 FL (ref 80–100)
MONOCYTES ABSOLUTE: 0.6 K/UL (ref 0–1.3)
MONOCYTES RELATIVE PERCENT: 7.6 %
NEUTROPHILS ABSOLUTE: 4.9 K/UL (ref 1.7–7.7)
NEUTROPHILS RELATIVE PERCENT: 66.7 %
PDW BLD-RTO: 14.7 % (ref 12.4–15.4)
PLATELET # BLD: 205 K/UL (ref 135–450)
PMV BLD AUTO: 9.2 FL (ref 5–10.5)
POTASSIUM REFLEX MAGNESIUM: 3.6 MMOL/L (ref 3.5–5.1)
PRO-BNP: 1213 PG/ML (ref 0–124)
RBC # BLD: 5.28 M/UL (ref 4.2–5.9)
SODIUM BLD-SCNC: 141 MMOL/L (ref 136–145)
TOTAL PROTEIN: 6.9 G/DL (ref 6.4–8.2)
WBC # BLD: 7.3 K/UL (ref 4–11)

## 2023-02-12 PROCEDURE — 6370000000 HC RX 637 (ALT 250 FOR IP): Performed by: INTERNAL MEDICINE

## 2023-02-12 PROCEDURE — 6360000002 HC RX W HCPCS: Performed by: HOSPITALIST

## 2023-02-12 PROCEDURE — 2580000003 HC RX 258: Performed by: INTERNAL MEDICINE

## 2023-02-12 PROCEDURE — 99233 SBSQ HOSP IP/OBS HIGH 50: CPT | Performed by: INTERNAL MEDICINE

## 2023-02-12 PROCEDURE — 36415 COLL VENOUS BLD VENIPUNCTURE: CPT

## 2023-02-12 PROCEDURE — 85025 COMPLETE CBC W/AUTO DIFF WBC: CPT

## 2023-02-12 PROCEDURE — 80053 COMPREHEN METABOLIC PANEL: CPT

## 2023-02-12 PROCEDURE — 2060000000 HC ICU INTERMEDIATE R&B

## 2023-02-12 PROCEDURE — 83880 ASSAY OF NATRIURETIC PEPTIDE: CPT

## 2023-02-12 PROCEDURE — 6370000000 HC RX 637 (ALT 250 FOR IP): Performed by: NURSE PRACTITIONER

## 2023-02-12 RX ORDER — LEVOFLOXACIN 5 MG/ML
750 INJECTION, SOLUTION INTRAVENOUS EVERY 24 HOURS
Status: DISCONTINUED | OUTPATIENT
Start: 2023-02-12 | End: 2023-02-14

## 2023-02-12 RX ADMIN — ASPIRIN 81 MG 81 MG: 81 TABLET ORAL at 09:07

## 2023-02-12 RX ADMIN — ROSUVASTATIN 40 MG: 20 TABLET, FILM COATED ORAL at 20:17

## 2023-02-12 RX ADMIN — FUROSEMIDE 40 MG: 40 TABLET ORAL at 09:07

## 2023-02-12 RX ADMIN — METOPROLOL SUCCINATE 25 MG: 25 TABLET, FILM COATED, EXTENDED RELEASE ORAL at 09:07

## 2023-02-12 RX ADMIN — APIXABAN 10 MG: 5 TABLET, FILM COATED ORAL at 20:16

## 2023-02-12 RX ADMIN — LISINOPRIL 2.5 MG: 2.5 TABLET ORAL at 09:07

## 2023-02-12 RX ADMIN — SODIUM CHLORIDE, PRESERVATIVE FREE 10 ML: 5 INJECTION INTRAVENOUS at 20:17

## 2023-02-12 RX ADMIN — LEVOFLOXACIN 750 MG: 5 INJECTION, SOLUTION INTRAVENOUS at 09:56

## 2023-02-12 RX ADMIN — SODIUM CHLORIDE, PRESERVATIVE FREE 10 ML: 5 INJECTION INTRAVENOUS at 09:07

## 2023-02-12 RX ADMIN — APIXABAN 10 MG: 5 TABLET, FILM COATED ORAL at 09:06

## 2023-02-12 ASSESSMENT — PAIN SCALES - GENERAL
PAINLEVEL_OUTOF10: 0

## 2023-02-12 NOTE — PROGRESS NOTES
[facility] Daily Progress Note      Admit Date:  2/8/2023    Subjective:  Mr. Vladimir Hernandez was seen and examined. F/U CHF/Cardiomyop/LV thrombus/Non st.  No complaints overnight. No sob. No chest pain. Breathing better. Objective:   /68   Pulse 93   Temp 98.1 °F (36.7 °C) (Oral)   Resp 18   Ht 5' 11\" (1.803 m)   Wt 194 lb 7.1 oz (88.2 kg)   SpO2 92%   BMI 27.12 kg/m²     Intake/Output Summary (Last 24 hours) at 2/12/2023 0711  Last data filed at 2/12/2023 0514  Gross per 24 hour   Intake 1260 ml   Output 2750 ml   Net -1490 ml       TELEMETRY: Sinus     Physical Exam:  General:  Awake, alert, NAD  Skin:  Warm and dry  Neck:  JVP difficult  Chest:  decreased BS, respiration normal  Cardiovascular:  RRR S1S2  Abdomen:  Soft nontender  Extremities:  no edema    Medications:    lisinopril  2.5 mg Oral Daily    furosemide  40 mg Oral Daily    metoprolol succinate  25 mg Oral Daily    sodium chloride flush  5-40 mL IntraVENous 2 times per day    apixaban  10 mg Oral BID    Followed by    Irwin Nino ON 2/17/2023] apixaban  5 mg Oral BID    sodium chloride flush  5-40 mL IntraVENous 2 times per day    aspirin  81 mg Oral Daily    rosuvastatin  40 mg Oral Nightly      sodium chloride       sodium chloride flush, acetaminophen, sodium chloride flush, sodium chloride, ondansetron **OR** ondansetron, polyethylene glycol    Lab Data:  CBC:   Recent Labs     02/10/23  1050 02/11/23  0518 02/12/23  0524   WBC 9.1 8.2 7.3   HGB 16.2 15.8 16.2   HCT 48.7 47.1 48.8   MCV 91.6 91.6 92.3    197 205     BMP:   Recent Labs     02/10/23  0447 02/10/23  1050 02/11/23  0518    138 139   K 3.7 3.9 3.9   CL 94* 93* 95*   CO2 36* 36* 39*   BUN 9 8 14   CREATININE 0.9 0.8* 1.1     LIVER PROFILE:   Recent Labs     02/10/23  1050 02/11/23  0518   AST 22 22   ALT 17 16   BILITOT 0.7 0.8   ALKPHOS 109 98     PT/INR:   No results for input(s): PROTIME, INR in the last 72 hours. APTT:   No results for input(s):  APTT in the last 72 hours. BNP:  No results for input(s): BNP in the last 72 hours. IMAGING:     Assessment:  Patient Active Problem List    Diagnosis Date Noted    NICM (nonischemic cardiomyopathy) ([facility] 02/10/2023    Acute decompensated heart failure ([facility] 02/10/2023    Acute respiratory failure with hypoxemia ([facility] 02/09/2023    Mixed hyperlipidemia 02/09/2023    NSTEMI (non-ST elevated myocardial infarction) ([facility] 02/08/2023       Plan:  No complaints. Breathing may be  some better. Negative fluid balance. Now on eliquis. On toprol/lisinopril. Bp soft for titration. Check BNP. Wean O2 as tolerated. Pulm evaluation noted.        Core Measures:  Discharge instructions:   LVEF documented:   ACEI for LV dysfunction:   Smoking Cessation:    Jv Flores MD, MD 2/12/2023 7:11 AM

## 2023-02-12 NOTE — PLAN OF CARE
Problem: Discharge Planning  Goal: Discharge to home or other facility with appropriate resources  Outcome: Progressing   Patient to be discharged when medically stable. Problem: Respiratory - Adult  Goal: Achieves optimal ventilation and oxygenation  Outcome: Progressing   SOB with exertion. SpO2 88%, oxygen increased to 2 L overnight. Will monitor. Problem: Cardiovascular - Adult  Goal: Maintains optimal cardiac output and hemodynamic stability  Outcome: Progressing   SR/ST on tele. Vitals stable. Denies dizziness/lightheadedness/chest pain. Problem: Genitourinary - Adult  Goal: Absence of urinary retention  Outcome: Progressing   Patient urinating adequately. Strict I & O's documented. Problem: Metabolic/Fluid and Electrolytes - Adult  Goal: Electrolytes maintained within normal limits  Outcome: Progressing   Monitoring labs daily. Problem: Safety - Adult  Goal: Free from fall injury  Outcome: Progressing   Fall precautions in place. Bed alarm activated, low position, wheels locked. Up with assist x 1 with gait belt. Call light and belongings within reach. Continue to monitor safety. Problem: Pain  Goal: Verbalizes/displays adequate comfort level or baseline comfort level  Outcome: Progressing   Denies pain overnight.

## 2023-02-12 NOTE — PROGRESS NOTES
Hospitalist Progress Note      Name:  Tania Hsu /Age/Sex: 1980  (40 y.o. male)   MRN & CSN:  [id number] & [id number] Admission Date/Time: 2023  4:10 PM   Location:  [facility]/5088-96 PCP: No primary care provider on file. Hospital Day: 5    Assessment and Plan:   Fuoggy45 y.o. male Tania Hsu history of PENG needing dialysis for 6 months, nicotine dependence 1 pack/day x 20 years presented to [facility] on  with complains of shortness of breath worse with exertion. 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 Patient's d-dimer is greatly elevated so a CT anterior of the chest was performed which was negative for PE but did confirm bilateral airspace disease. Initially was planning to transfer to [facility].   morning, had an episode of diaphoresis, repeat troponin was 0.87, EKG shows inferior and V4 to V6 T wave inversions, patient was transferred to [facility], [facility]. for further management of NSTEMI.     Persistent hypoxia  Patient admitted to significant diuresis since yesterday, patient CT PE done at [facility] was negative, Cardiology Did heart cath , has normal coronaries and end-diastolic pressures were also on the lower side, given unexplained hypoxia pulmonary recommendation is recommended we did consult pulmonary,  check urine drug screen positive to benzo   Order CT chest show atypical pneumonia   Start on iv levofloxacin iv   Today on 2 L o2   Wean off O2   Activity as tolerated            NSTEMI  S/p cath   Asa/statin heparin drip, cardiology consulted  Coronaries normal except possible Distal D3 occlusion,  will check urine drug screen,     Apical thrombi  Was Heparin drip  Switched to elquis      Concerns of heart failure on outside hospital  Follow-up on echocardiogram  On oral lasix      Patient does mention that he was on dialysis for about 6 months, renal function stable    Tobacco abuse: counseled     Diet ADULT DIET; Regular; Low Fat/Low Chol/High Fiber/2 gm Na   DVT Prophylaxis [] Lovenox, []  Heparin, [] SCDs, [] Ambulation   GI Prophylaxis [] PPI,  [] H2 Blocker,  [] Carafate,  [] Diet/Tube Feeds   Code Status Full Code   Disposition Patient requires continued admission due to    MDM [] Low, [] Moderate,[]  High  Patient's risk as above due to      History of Present Illness:   Was seen and examined  Still on 2 L O2  Order CT chest reports atypical pneumonia   Start on iv levofloxacin   Has sinus tachycardia on BB    Objective: Intake/Output Summary (Last 24 hours) at 2/12/2023 1006  Last data filed at 2/12/2023 0957  Gross per 24 hour   Intake 1260 ml   Output 2350 ml   Net -1090 ml        Vitals:   Vitals:    02/12/23 0900   BP: 103/68   Pulse: (!) 103   Resp: 18   Temp: 97.8 °F (36.6 °C)   SpO2: 93%     Physical Exam:   GEN Awake.  Alert , not in respiratory distress, not in pain  HEENT: PEERLA, , supple neck,   Chest: air entry equal bilaterally, no wheezing or crepitation, decrease breathing bilaterally   Heart: S1 and S2 heard, no murmur, no gallop or rub, regular rate  Abdomen: soft, ND , Nt, +BS  Extremities: no cyanosis, tenderness or erythema, peripheral pulses audible  Neurology: alert, oriented x3, able to move 4 limbs    Medications:   Medications:    levofloxacin  750 mg IntraVENous Q24H    lisinopril  2.5 mg Oral Daily    furosemide  40 mg Oral Daily    metoprolol succinate  25 mg Oral Daily    sodium chloride flush  5-40 mL IntraVENous 2 times per day    apixaban  10 mg Oral BID    Followed by    Ba Mckeon ON 2/17/2023] apixaban  5 mg Oral BID    sodium chloride flush  5-40 mL IntraVENous 2 times per day    aspirin  81 mg Oral Daily rosuvastatin  40 mg Oral Nightly      Infusions:    sodium chloride       PRN Meds: sodium chloride flush, 5-40 mL, PRN  acetaminophen, 650 mg, Q4H PRN  sodium chloride flush, 5-40 mL, PRN  sodium chloride, , PRN  ondansetron, 4 mg, Q8H PRN   Or  ondansetron, 4 mg, Q6H PRN  polyethylene glycol, 17 g, Daily PRN        Electronically signed by Steve Calle MD on 2/12/2023 at 10:06 AM

## 2023-02-12 NOTE — PLAN OF CARE
Problem: Discharge Planning  Goal: Discharge to home or other facility with appropriate resources  2/12/2023 0917 by Daphne Diaz RN  Outcome: Progressing  Flowsheets (Taken 2/12/2023 9921)  Discharge to home or other facility with appropriate resources:   Identify barriers to discharge with patient and caregiver   Arrange for needed discharge resources and transportation as appropriate   Identify discharge learning needs (meds, wound care, etc)     Problem: Respiratory - Adult  Goal: Achieves optimal ventilation and oxygenation  2/12/2023 0917 by Daphne Diaz RN  Outcome: Progressing  Flowsheets (Taken 2/12/2023 0917)  Achieves optimal ventilation and oxygenation:   Assess for changes in respiratory status   Assess for changes in mentation and behavior   Position to facilitate oxygenation and minimize respiratory effort   Oxygen supplementation based on oxygen saturation or arterial blood gases     Problem: Cardiovascular - Adult  Goal: Maintains optimal cardiac output and hemodynamic stability  2/12/2023 0917 by Daphne Diaz RN  Outcome: Progressing  Flowsheets (Taken 2/12/2023 4641)  Maintains optimal cardiac output and hemodynamic stability:   Monitor blood pressure and heart rate   Monitor urine output and notify Licensed Independent Practitioner for values outside of normal range   Assess for signs of decreased cardiac output   Administer fluid and/or volume expanders as ordered     Problem: Cardiovascular - Adult  Goal: Absence of cardiac dysrhythmias or at baseline  Outcome: Progressing  Flowsheets (Taken 2/12/2023 0917)  Absence of cardiac dysrhythmias or at baseline:   Monitor cardiac rate and rhythm   Assess for signs of decreased cardiac output     Problem: Genitourinary - Adult  Goal: Absence of urinary retention  2/12/2023 0917 by Daphne Diaz RN  Outcome: Progressing     Problem: Metabolic/Fluid and Electrolytes - Adult  Goal: Electrolytes maintained within normal limits  2/12/2023 0917 by aLwrence Singh RN  Outcome: Progressing  Flowsheets (Taken 2/12/2023 8762)  Electrolytes maintained within normal limits:   Monitor labs and assess patient for signs and symptoms of electrolyte imbalances   Administer electrolyte replacement as ordered   Monitor response to electrolyte replacements, including repeat lab results as appropriate  2/12/2023 0235 by Dayne Hatfield RN  Outcome: Progressing     Problem: Safety - Adult  Goal: Free from fall injury  2/12/2023 0917 by Lawrence Singh RN  Outcome: Progressing  Flowsheets (Taken 2/12/2023 0917)  Free From Fall Injury: Instruct family/caregiver on patient safety  2/12/2023 0235 by Dayne Hatfield RN  Outcome: Progressing     Problem: ABCDS Injury Assessment  Goal: Absence of physical injury  Outcome: Progressing  Flowsheets (Taken 2/12/2023 0917)  Absence of Physical Injury: Implement safety measures based on patient assessment

## 2023-02-12 NOTE — DISCHARGE INSTRUCTIONS
Low salt diet  Heart Vest  Follow up with cardiology Dr Arthur De Dios blood pressure 3-4/week, make a log, show it to pcp for medication adjustment  Avoid extra salt in diet        Extra Heart Failure sites:   https://[employer]/   --- this is American Heart Association interactive Healthier Living with Heart Failure guidebook. Please copy and paste link into search bar. Use your mouse to scroll through the pages. Lots and lots of info / tips    HF [employer]ven diana  --- free smart phone diana available for [employer] and [employer]. Use your phone to track sodium / fluid intake,  symptoms, weight, etc.    [employer] - website-- [employer] is a dialysis company. All dialysis patients follow a renal diet which IS low sodium!! This website offers free seasonal cookbooks.   Each quarter, they will release 25-30 new recipes with a breakdown of calories, sodium, glucose, etc    [email or web address]/recipes -- more free recipes

## 2023-02-13 LAB
A/G RATIO: 1.2 (ref 1.1–2.2)
ALBUMIN SERPL-MCNC: 3.7 G/DL (ref 3.4–5)
ALP BLD-CCNC: 95 U/L (ref 40–129)
ALT SERPL-CCNC: 26 U/L (ref 10–40)
ANION GAP SERPL CALCULATED.3IONS-SCNC: 8 MMOL/L (ref 3–16)
AST SERPL-CCNC: 38 U/L (ref 15–37)
BASOPHILS ABSOLUTE: 0.1 K/UL (ref 0–0.2)
BASOPHILS RELATIVE PERCENT: 0.7 %
BILIRUB SERPL-MCNC: 0.5 MG/DL (ref 0–1)
BUN BLDV-MCNC: 17 MG/DL (ref 7–20)
CALCIUM SERPL-MCNC: 9.1 MG/DL (ref 8.3–10.6)
CHLORIDE BLD-SCNC: 97 MMOL/L (ref 99–110)
CO2: 30 MMOL/L (ref 21–32)
CREAT SERPL-MCNC: 0.9 MG/DL (ref 0.9–1.3)
EOSINOPHILS ABSOLUTE: 0.2 K/UL (ref 0–0.6)
EOSINOPHILS RELATIVE PERCENT: 2.6 %
GFR SERPL CREATININE-BSD FRML MDRD: >60 ML/MIN/{1.73_M2}
GLUCOSE BLD-MCNC: 93 MG/DL (ref 70–99)
HCT VFR BLD CALC: 47.6 % (ref 40.5–52.5)
HEMOGLOBIN: 16.1 G/DL (ref 13.5–17.5)
LYMPHOCYTES ABSOLUTE: 1.8 K/UL (ref 1–5.1)
LYMPHOCYTES RELATIVE PERCENT: 22.9 %
MCH RBC QN AUTO: 30.9 PG (ref 26–34)
MCHC RBC AUTO-ENTMCNC: 33.8 G/DL (ref 31–36)
MCV RBC AUTO: 91.6 FL (ref 80–100)
MONOCYTES ABSOLUTE: 0.6 K/UL (ref 0–1.3)
MONOCYTES RELATIVE PERCENT: 7.8 %
NEUTROPHILS ABSOLUTE: 5.3 K/UL (ref 1.7–7.7)
NEUTROPHILS RELATIVE PERCENT: 66 %
PDW BLD-RTO: 14.6 % (ref 12.4–15.4)
PLATELET # BLD: 206 K/UL (ref 135–450)
PMV BLD AUTO: 9.5 FL (ref 5–10.5)
POTASSIUM REFLEX MAGNESIUM: 3.6 MMOL/L (ref 3.5–5.1)
RBC # BLD: 5.19 M/UL (ref 4.2–5.9)
SODIUM BLD-SCNC: 135 MMOL/L (ref 136–145)
TOTAL PROTEIN: 6.9 G/DL (ref 6.4–8.2)
WBC # BLD: 8 K/UL (ref 4–11)

## 2023-02-13 PROCEDURE — 6370000000 HC RX 637 (ALT 250 FOR IP): Performed by: INTERNAL MEDICINE

## 2023-02-13 PROCEDURE — 2060000000 HC ICU INTERMEDIATE R&B

## 2023-02-13 PROCEDURE — 99232 SBSQ HOSP IP/OBS MODERATE 35: CPT | Performed by: INTERNAL MEDICINE

## 2023-02-13 PROCEDURE — 85025 COMPLETE CBC W/AUTO DIFF WBC: CPT

## 2023-02-13 PROCEDURE — 36415 COLL VENOUS BLD VENIPUNCTURE: CPT

## 2023-02-13 PROCEDURE — 80053 COMPREHEN METABOLIC PANEL: CPT

## 2023-02-13 PROCEDURE — 2580000003 HC RX 258: Performed by: INTERNAL MEDICINE

## 2023-02-13 PROCEDURE — 6360000002 HC RX W HCPCS: Performed by: HOSPITALIST

## 2023-02-13 RX ADMIN — ASPIRIN 81 MG 81 MG: 81 TABLET ORAL at 08:09

## 2023-02-13 RX ADMIN — APIXABAN 10 MG: 5 TABLET, FILM COATED ORAL at 21:45

## 2023-02-13 RX ADMIN — SODIUM CHLORIDE, PRESERVATIVE FREE 10 ML: 5 INJECTION INTRAVENOUS at 22:29

## 2023-02-13 RX ADMIN — LEVOFLOXACIN 750 MG: 5 INJECTION, SOLUTION INTRAVENOUS at 08:13

## 2023-02-13 RX ADMIN — FUROSEMIDE 40 MG: 40 TABLET ORAL at 08:09

## 2023-02-13 RX ADMIN — SODIUM CHLORIDE, PRESERVATIVE FREE 10 ML: 5 INJECTION INTRAVENOUS at 08:10

## 2023-02-13 RX ADMIN — SODIUM CHLORIDE, PRESERVATIVE FREE 10 ML: 5 INJECTION INTRAVENOUS at 08:13

## 2023-02-13 RX ADMIN — ROSUVASTATIN 40 MG: 20 TABLET, FILM COATED ORAL at 21:46

## 2023-02-13 RX ADMIN — APIXABAN 10 MG: 5 TABLET, FILM COATED ORAL at 08:09

## 2023-02-13 RX ADMIN — SODIUM CHLORIDE, PRESERVATIVE FREE 10 ML: 5 INJECTION INTRAVENOUS at 22:28

## 2023-02-13 NOTE — PROGRESS NOTES
Cardiology Consult Service  Daily Progress Note        Admit Date:  2/8/2023    Subjective: Interval history:  Still hypoxic   Atypical pneumonia on ct    Objective:     Medications:   levofloxacin  750 mg IntraVENous Q24H    lisinopril  2.5 mg Oral Daily    furosemide  40 mg Oral Daily    metoprolol succinate  25 mg Oral Daily    sodium chloride flush  5-40 mL IntraVENous 2 times per day    apixaban  10 mg Oral BID    Followed by    Sanjana Zuluaga ON 2/17/2023] apixaban  5 mg Oral BID    sodium chloride flush  5-40 mL IntraVENous 2 times per day    aspirin  81 mg Oral Daily    rosuvastatin  40 mg Oral Nightly       IV drips:   sodium chloride         PRN:  sodium chloride flush, acetaminophen, sodium chloride flush, sodium chloride, ondansetron **OR** ondansetron, polyethylene glycol    Vitals:    02/13/23 0412 02/13/23 0644 02/13/23 0806 02/13/23 1021   BP: 96/66  99/65    Pulse: 96 (!) 102 (!) 103 (!) 108   Resp: 16  18    Temp: 97.6 °F (36.4 °C)  97.8 °F (36.6 °C)    TempSrc: Oral  Oral    SpO2: 91%  91%    Weight: 196 lb 13.9 oz (89.3 kg)      Height:           Intake/Output Summary (Last 24 hours) at 2/13/2023 1117  Last data filed at 2/13/2023 1021  Gross per 24 hour   Intake 840 ml   Output 3450 ml   Net -2610 ml     I/O last 3 completed shifts: In: 5213 [P.O.:1620]  Out: 4300 [Urine:4300]  Wt Readings from Last 3 Encounters:   02/13/23 196 lb 13.9 oz (89.3 kg)       Admit Wt: Weight: 210 lb 1.6 oz (95.3 kg)   Todays Wt: Weight: 196 lb 13.9 oz (89.3 kg)      Physical Exam:     Physical Exam  Constitutional:       Appearance: Normal appearance. HENT:      Head: Normocephalic and atraumatic. Nose: Nose normal.   Eyes:      Conjunctiva/sclera: Conjunctivae normal.   Cardiovascular:      Rate and Rhythm: Normal rate and regular rhythm. Heart sounds: Normal heart sounds. Pulmonary:      Effort: Pulmonary effort is normal.      Breath sounds: Normal breath sounds.    Abdominal:      Palpations: Abdomen is soft. Musculoskeletal:      Cervical back: Neck supple. Skin:     General: Skin is warm and dry. Neurological:      General: No focal deficit present. Mental Status: He is alert. Labs:   Recent Labs     02/11/23 0518 02/12/23 0524 02/13/23 0439    141 135*   K 3.9 3.6 3.6   BUN 14 16 17   CREATININE 1.1 0.9 0.9   CL 95* 96* 97*   CO2 39* 34* 30   GLUCOSE 92 83 93   CALCIUM 9.5 9.3 9.1     Recent Labs     02/11/23 0518 02/12/23 0524 02/13/23 0439   WBC 8.2 7.3 8.0   HGB 15.8 16.2 16.1   HCT 47.1 48.8 47.6    205 206   MCV 91.6 92.3 91.6     No results for input(s): CHOLTOT, TRIG, HDL, CHOLHDL, LDL in the last 72 hours. Invalid input(s): Gabriela Standing  No results for input(s): PTT, INR in the last 72 hours. Invalid input(s): PT  No results for input(s): CKTOTAL, CKMB, CKMBINDEX, TROPONINI in the last 72 hours. No results for input(s): BNP in the last 72 hours. No results for input(s): NTPROBNP in the last 72 hours. No results for input(s): TSH in the last 72 hours. Imaging:       Assessment & Plan:     Acute systolic heart failure LVEF <20%  Atypical Pneumonia  NSTEMI  Apical thrombus  Nonischemic cardiomyopathy  Coronary embolus    Continue betablocker, diuretic, lisinopril  BP on low side for further med titration including ARNI/MRA; SGLT2I consideration as outpatient  Continue quinolone, monitor for arrhythmias (high risk med)  LifeVest  Continue eliquis    ere addressed to the patient. Thank you for allowing to us to participate in the care or Tania Hsu. Please call our service with questions.     Erin Cheney MD, [facility]  The [address]  [address] [place] [address]  [address] [address]  Ph: [phone]  Fax: [phone]

## 2023-02-13 NOTE — CARE COORDINATION
CM continues to follow for DC planning and needs. Patient noted to need life vest at DC, cardiology working on. Patient continues to wean on O2, if unable to wean will need home O2 eval and possible new home O2 at DC. Per MD in rounds, plan for DC in next 24-48 hours. Patient will return home at DC.     Thank you,  Dallas Hill RN,   4th Floor Progressive Care Unit  [phone]

## 2023-02-13 NOTE — PROGRESS NOTES
Nurse attempted to wean patient off oxygen but SpO2 dropped to 85% RA while sleeping. 1 L NC re-applied.

## 2023-02-13 NOTE — PROGRESS NOTES
Hospitalist Progress Note      PCP: No primary care provider on file. Date of Admission: 2/8/2023    Chief Complaint: Shortness of breath      Subjective: Chart reviewed. Admitted with acute heart failure with low ejection fraction, NSTEMI and atypical pneumonia. He reports improvement in shortness of breath though still on oxygen. Occasional cough but denies any chest pain or fever. Medications:  Reviewed    Infusion Medications    sodium chloride       Scheduled Medications    levofloxacin  750 mg IntraVENous Q24H    lisinopril  2.5 mg Oral Daily    furosemide  40 mg Oral Daily    metoprolol succinate  25 mg Oral Daily    sodium chloride flush  5-40 mL IntraVENous 2 times per day    apixaban  10 mg Oral BID    Followed by    Ba Mckeon ON 2/17/2023] apixaban  5 mg Oral BID    sodium chloride flush  5-40 mL IntraVENous 2 times per day    aspirin  81 mg Oral Daily    rosuvastatin  40 mg Oral Nightly     PRN Meds: sodium chloride flush, acetaminophen, sodium chloride flush, sodium chloride, ondansetron **OR** ondansetron, polyethylene glycol      Intake/Output Summary (Last 24 hours) at 2/13/2023 1147  Last data filed at 2/13/2023 1021  Gross per 24 hour   Intake 840 ml   Output 3050 ml   Net -2210 ml       Physical Exam Performed:    BP 96/68   Pulse (!) 111   Temp 97.8 °F (36.6 °C) (Oral)   Resp 18   Ht 5' 11\" (1.803 m)   Wt 196 lb 13.9 oz (89.3 kg)   SpO2 90%   BMI 27.46 kg/m²     General appearance: No apparent distress, appears stated age and cooperative. HEENT: Pupils equal, round, and reactive to light. Conjunctivae/corneas clear. Neck: Supple, with full range of motion. No jugular venous distention. Trachea midline. Respiratory: Bilateral basal crackles  Cardiovascular: Regular rate and rhythm with normal S1/S2 without murmurs, rubs or gallops. Abdomen: Soft, non-tender, non-distended with normal bowel sounds. Musculoskeletal: No clubbing, cyanosis or edema bilaterally.   Full range of motion without deformity. Skin: Skin color, texture, turgor normal.  No rashes or lesions. Neurologic:  Neurovascularly intact without any focal sensory/motor deficits. Cranial nerves: II-XII intact, grossly non-focal.  Psychiatric: Alert and oriented, thought content appropriate, normal insight  Capillary Refill: Brisk, 3 seconds, normal   Peripheral Pulses: +2 palpable, equal bilaterally       Labs:   Recent Labs     02/11/23 0518 02/12/23 0524 02/13/23 0439   WBC 8.2 7.3 8.0   HGB 15.8 16.2 16.1   HCT 47.1 48.8 47.6    205 206     Recent Labs     02/11/23 0518 02/12/23 0524 02/13/23  0439    141 135*   K 3.9 3.6 3.6   CL 95* 96* 97*   CO2 39* 34* 30   BUN 14 16 17   CREATININE 1.1 0.9 0.9   CALCIUM 9.5 9.3 9.1     Recent Labs     02/11/23 0518 02/12/23 0524 02/13/23  0439   AST 22 31 38*   ALT 16 20 26   BILITOT 0.8 0.7 0.5   ALKPHOS 98 98 95     No results for input(s): INR in the last 72 hours. No results for input(s): Cassie Opal in the last 72 hours. Urinalysis:    No results found for: [place], BACTERIA, RBCUA, BLOODU, [address], [place] [address]    Radiology:  CT CHEST WO CONTRAST   Final Result      1. Small foci of groundglass opacity with septal thickening in the left upper lobe and in the middle lobe. Lower lung branching opacities with septal thickening. Findings suggest atypical pneumonia. Clinical correlation recommended. 2. Small pericardial effusion. 3. Nonobstructive left nephrolithiasis. XR CHEST (2 VW)   Final Result   1. No evidence of acute cardiopulmonary disease. XR CHEST 1 VIEW   Final Result   1. There is some hazy groundglass opacity seen projecting over the right midlung. This may reflect some overlying soft tissue attenuation related to rotated patient positioning. Asymmetric pulmonary edema or pneumonitis is a differential consideration. This could be better characterized with dedicated PA and lateral chest radiograph.           IP CONSULT TO CARDIOLOGY  IP CONSULT TO PULMONOLOGY    Assessment/Plan:    Active Hospital Problems    Diagnosis     NICM (nonischemic cardiomyopathy) ([facility] [I42.8]      Priority: Medium    Acute decompensated heart failure ([facility] [I50.9]      Priority: Medium    Acute respiratory failure with hypoxemia (HCC) [J96.01]      Priority: Medium    Mixed hyperlipidemia [E78.2]      Priority: Medium    NSTEMI (non-ST elevated myocardial infarction) ([facility] [I21.4]      Priority: Medium     1. Acute systolic heart failure due to nonischemic cardiomyopathy  -Cardiac cath with nonobstructive CAD. Remote history of cocaine use. Denies use of alcohol or any other illicit drug. UDS was positive only for benzodiazepine.  -Continue diuretic, low-dose lisinopril and beta-blocker.  -Keep on low-salt diet  -Monitor intake output and daily body weight  -Will need LifeVest on discharge and cardiology will arrange for it. 2.  Atypical pneumonia on CT chest  -Started on antibiotics on 2/12/2023. Complete total 7 days. 3.  Acute hypoxic respiratory failure due to acute systolic heart failure and pneumonia. 4.  NSTEMI   -Cardiac cath with normal coronaries except possible distal D3 occlusion.  -Continue aspirin, statin  -Cardiology are following. 5.  Left ventricular thrombus  -Continue apixaban and will need on discharge. DVT Prophylaxis: Apixaban  Diet: ADULT DIET; Regular; Low Fat/Low Chol/High Fiber/2 gm Na  Code Status: Full Code  PT/OT Eval Status: No need    Dispo -likely 1-2 more days.         Yusuf Bernardo MD

## 2023-02-13 NOTE — PLAN OF CARE
Problem: Discharge Planning  Goal: Discharge to home or other facility with appropriate resources  Outcome: Progressing   Patient to be discharged home when medically stable. Problem: Respiratory - Adult  Goal: Achieves optimal ventilation and oxygenation  Outcome: Progressing   Sob noted with exertion. Lungs clear, diminished bases. SpO2 90-92% 1 L NC. Problem: Cardiovascular - Adult  Goal: Maintains optimal cardiac output and hemodynamic stability  Outcome: Progressing   Vitals stable, afebrile. Denies chest pain. SR/ST on tele. Problem: Genitourinary - Adult  Goal: Absence of urinary retention  Outcome: Progressing   Patient urinating without difficulties. Problem: Metabolic/Fluid and Electrolytes - Adult  Goal: Electrolytes maintained within normal limits  Outcome: Progressing   Monitoring electrolytes daily. Problem: Safety - Adult  Goal: Free from fall injury  Outcome: Progressing  Patient does not meet fall criteria. Gait steady. Call light in reach, patient encouraged to call with needs and concerns.

## 2023-02-13 NOTE — PLAN OF CARE
Problem: Discharge Planning  Goal: Discharge to home or other facility with appropriate resources  Outcome: Progressing     Problem: Respiratory - Adult  Goal: Achieves optimal ventilation and oxygenation  Outcome: Progressing     Problem: Cardiovascular - Adult  Goal: Maintains optimal cardiac output and hemodynamic stability  Outcome: Progressing  Flowsheets (Taken 2/13/2023 0806)  Maintains optimal cardiac output and hemodynamic stability: Monitor blood pressure and heart rate  Goal: Absence of cardiac dysrhythmias or at baseline  Outcome: Progressing  Flowsheets (Taken 2/13/2023 0806)  Absence of cardiac dysrhythmias or at baseline: Monitor cardiac rate and rhythm     Problem: Genitourinary - Adult  Goal: Absence of urinary retention  Outcome: Progressing  Flowsheets (Taken 2/13/2023 0806)  Absence of urinary retention: Assess patients ability to void and empty bladder     Problem: Safety - Adult  Goal: Free from fall injury  Outcome: Progressing  Flowsheets (Taken 2/13/2023 0806)  Free From Fall Injury: Instruct family/caregiver on patient safety     Problem: ABCDS Injury Assessment  Goal: Absence of physical injury  Outcome: Progressing  Flowsheets (Taken 2/13/2023 0806)  Absence of Physical Injury: Implement safety measures based on patient assessment     Problem: Pain  Goal: Verbalizes/displays adequate comfort level or baseline comfort level  Outcome: Progressing     Problem: Metabolic/Fluid and Electrolytes - Adult  Goal: Electrolytes maintained within normal limits  Outcome: Progressing  Goal: Hemodynamic stability and optimal renal function maintained  Outcome: Progressing

## 2023-02-14 ENCOUNTER — APPOINTMENT (OUTPATIENT)
Dept: GENERAL RADIOLOGY | Age: 43
DRG: 280 | End: 2023-02-14
Attending: INTERNAL MEDICINE
Payer: COMMERCIAL

## 2023-02-14 LAB
ANION GAP SERPL CALCULATED.3IONS-SCNC: 9 MMOL/L (ref 3–16)
BASOPHILS ABSOLUTE: 0.1 K/UL (ref 0–0.2)
BASOPHILS RELATIVE PERCENT: 0.8 %
BUN BLDV-MCNC: 17 MG/DL (ref 7–20)
CALCIUM SERPL-MCNC: 9.5 MG/DL (ref 8.3–10.6)
CHLORIDE BLD-SCNC: 97 MMOL/L (ref 99–110)
CO2: 30 MMOL/L (ref 21–32)
CREAT SERPL-MCNC: 1 MG/DL (ref 0.9–1.3)
EOSINOPHILS ABSOLUTE: 0.2 K/UL (ref 0–0.6)
EOSINOPHILS RELATIVE PERCENT: 2.8 %
GFR SERPL CREATININE-BSD FRML MDRD: >60 ML/MIN/{1.73_M2}
GLUCOSE BLD-MCNC: 95 MG/DL (ref 70–99)
HCT VFR BLD CALC: 49.9 % (ref 40.5–52.5)
HEMOGLOBIN: 16.5 G/DL (ref 13.5–17.5)
LYMPHOCYTES ABSOLUTE: 2 K/UL (ref 1–5.1)
LYMPHOCYTES RELATIVE PERCENT: 26.8 %
MCH RBC QN AUTO: 30.6 PG (ref 26–34)
MCHC RBC AUTO-ENTMCNC: 33.2 G/DL (ref 31–36)
MCV RBC AUTO: 92.1 FL (ref 80–100)
MONOCYTES ABSOLUTE: 0.6 K/UL (ref 0–1.3)
MONOCYTES RELATIVE PERCENT: 7.6 %
NEUTROPHILS ABSOLUTE: 4.7 K/UL (ref 1.7–7.7)
NEUTROPHILS RELATIVE PERCENT: 62 %
PDW BLD-RTO: 14.7 % (ref 12.4–15.4)
PLATELET # BLD: 217 K/UL (ref 135–450)
PMV BLD AUTO: 9.3 FL (ref 5–10.5)
POTASSIUM REFLEX MAGNESIUM: 3.9 MMOL/L (ref 3.5–5.1)
RBC # BLD: 5.41 M/UL (ref 4.2–5.9)
SODIUM BLD-SCNC: 136 MMOL/L (ref 136–145)
WBC # BLD: 7.6 K/UL (ref 4–11)

## 2023-02-14 PROCEDURE — 71046 X-RAY EXAM CHEST 2 VIEWS: CPT

## 2023-02-14 PROCEDURE — 6360000002 HC RX W HCPCS: Performed by: HOSPITALIST

## 2023-02-14 PROCEDURE — 6370000000 HC RX 637 (ALT 250 FOR IP): Performed by: NURSE PRACTITIONER

## 2023-02-14 PROCEDURE — 36415 COLL VENOUS BLD VENIPUNCTURE: CPT

## 2023-02-14 PROCEDURE — 80048 BASIC METABOLIC PNL TOTAL CA: CPT

## 2023-02-14 PROCEDURE — 99232 SBSQ HOSP IP/OBS MODERATE 35: CPT | Performed by: INTERNAL MEDICINE

## 2023-02-14 PROCEDURE — 85025 COMPLETE CBC W/AUTO DIFF WBC: CPT

## 2023-02-14 PROCEDURE — 6370000000 HC RX 637 (ALT 250 FOR IP): Performed by: INTERNAL MEDICINE

## 2023-02-14 PROCEDURE — 2580000003 HC RX 258: Performed by: INTERNAL MEDICINE

## 2023-02-14 PROCEDURE — 2060000000 HC ICU INTERMEDIATE R&B

## 2023-02-14 RX ORDER — CEFUROXIME AXETIL 250 MG/1
500 TABLET ORAL EVERY 12 HOURS SCHEDULED
Status: DISCONTINUED | OUTPATIENT
Start: 2023-02-14 | End: 2023-02-15 | Stop reason: HOSPADM

## 2023-02-14 RX ADMIN — SODIUM CHLORIDE, PRESERVATIVE FREE 10 ML: 5 INJECTION INTRAVENOUS at 09:06

## 2023-02-14 RX ADMIN — CEFUROXIME AXETIL 500 MG: 250 TABLET, FILM COATED ORAL at 20:44

## 2023-02-14 RX ADMIN — LEVOFLOXACIN 750 MG: 5 INJECTION, SOLUTION INTRAVENOUS at 09:04

## 2023-02-14 RX ADMIN — FUROSEMIDE 40 MG: 40 TABLET ORAL at 08:59

## 2023-02-14 RX ADMIN — APIXABAN 10 MG: 5 TABLET, FILM COATED ORAL at 08:58

## 2023-02-14 RX ADMIN — LISINOPRIL 2.5 MG: 2.5 TABLET ORAL at 08:59

## 2023-02-14 RX ADMIN — SODIUM CHLORIDE, PRESERVATIVE FREE 10 ML: 5 INJECTION INTRAVENOUS at 08:59

## 2023-02-14 RX ADMIN — ROSUVASTATIN 40 MG: 20 TABLET, FILM COATED ORAL at 20:43

## 2023-02-14 RX ADMIN — METOPROLOL SUCCINATE 25 MG: 25 TABLET, FILM COATED, EXTENDED RELEASE ORAL at 08:59

## 2023-02-14 RX ADMIN — ASPIRIN 81 MG 81 MG: 81 TABLET ORAL at 08:59

## 2023-02-14 RX ADMIN — SODIUM CHLORIDE, PRESERVATIVE FREE 10 ML: 5 INJECTION INTRAVENOUS at 20:43

## 2023-02-14 RX ADMIN — APIXABAN 10 MG: 5 TABLET, FILM COATED ORAL at 20:43

## 2023-02-14 NOTE — PLAN OF CARE
Problem: Discharge Planning  Goal: Discharge to home or other facility with appropriate resources  2/14/2023 0252 by Shane Jordan RN  Outcome: Progressing  Flowsheets (Taken 2/14/2023 0252)  Discharge to home or other facility with appropriate resources:   Identify barriers to discharge with patient and caregiver   Arrange for needed discharge resources and transportation as appropriate   Identify discharge learning needs (meds, wound care, etc)   Arrange for interpreters to assist at discharge as needed   Refer to discharge planning if patient needs post-hospital services based on physician order or complex needs related to functional status, cognitive ability or social support system     Problem: Respiratory - Adult  Goal: Achieves optimal ventilation and oxygenation  2/14/2023 0252 by Shane Jordan RN  Outcome: Progressing  Flowsheets (Taken 2/14/2023 0252)  Achieves optimal ventilation and oxygenation:   Assess for changes in respiratory status   Assess for changes in mentation and behavior   Position to facilitate oxygenation and minimize respiratory effort   Oxygen supplementation based on oxygen saturation or arterial blood gases   Initiate smoking cessation protocol as indicated   Assess the need for suctioning and aspirate as needed   Encourage broncho-pulmonary hygiene including cough, deep breathe, incentive spirometry   Assess and instruct to report shortness of breath or any respiratory difficulty   Respiratory therapy support as indicated     Problem: Cardiovascular - Adult  Goal: Maintains optimal cardiac output and hemodynamic stability  2/14/2023 0252 by Shane Jordan RN  Outcome: Progressing  Flowsheets (Taken 2/14/2023 0252)  Maintains optimal cardiac output and hemodynamic stability:   Monitor blood pressure and heart rate   Assess for signs of decreased cardiac output   Administer fluid and/or volume expanders as ordered   Monitor urine output and notify Licensed Independent Practitioner for values outside of normal range   Administer vasoactive medications as ordered     Problem: Cardiovascular - Adult  Goal: Absence of cardiac dysrhythmias or at baseline  2/14/2023 0252 by Mary Marcelino RN  Outcome: Progressing  Flowsheets (Taken 2/14/2023 0252)  Absence of cardiac dysrhythmias or at baseline:   Monitor cardiac rate and rhythm   Assess for signs of decreased cardiac output   Administer antiarrhythmia medication and electrolyte replacement as ordered     Problem: Genitourinary - Adult  Goal: Absence of urinary retention  2/14/2023 0252 by Mary Marcelino RN  Outcome: Progressing  Flowsheets (Taken 2/14/2023 0252)  Absence of urinary retention:   Assess patients ability to void and empty bladder   Monitor intake/output and perform bladder scan as needed   Place urinary catheter per Licensed Independent Practitioner order if needed   Discuss with Licensed Independent Practitioner  medications to alleviate retention as needed   Discuss catheterization for long term situations as appropriate     Problem: Safety - Adult  Goal: Free from fall injury  2/14/2023 0252 by Mary Marcelino RN  Outcome: Progressing  Flowsheets (Taken 2/14/2023 0252)  Free From Fall Injury: Instruct family/caregiver on patient safety     Problem: ABCDS Injury Assessment  Goal: Absence of physical injury  2/14/2023 0252 by Mary Marcelino RN  Outcome: Progressing  Flowsheets (Taken 2/14/2023 0252)  Absence of Physical Injury: Implement safety measures based on patient assessment     Problem: Pain  Goal: Verbalizes/displays adequate comfort level or baseline comfort level  2/14/2023 0252 by Mary Marcelino RN  Outcome: Progressing  Flowsheets (Taken 2/14/2023 0252)  Verbalizes/displays adequate comfort level or baseline comfort level:   Encourage patient to monitor pain and request assistance   Assess pain using appropriate pain scale   Implement non-pharmacological measures as appropriate and evaluate response   Administer analgesics based on type and severity of pain and evaluate response   Consider cultural and social influences on pain and pain management   Notify Licensed Independent Practitioner if interventions unsuccessful or patient reports new pain     Problem: Metabolic/Fluid and Electrolytes - Adult  Goal: Electrolytes maintained within normal limits  2/14/2023 0252 by Dilma Reis RN  Outcome: Progressing  Flowsheets (Taken 2/14/2023 0252)  Electrolytes maintained within normal limits:   Monitor labs and assess patient for signs and symptoms of electrolyte imbalances   Administer electrolyte replacement as ordered   Monitor response to electrolyte replacements, including repeat lab results as appropriate   Fluid restriction as ordered   Instruct patient on fluid and nutrition restrictions as appropriate     Problem: Metabolic/Fluid and Electrolytes - Adult  Goal: Hemodynamic stability and optimal renal function maintained  2/14/2023 0252 by Dilma Reis RN  Outcome: Progressing  Flowsheets (Taken 2/14/2023 0252)  Hemodynamic stability and optimal renal function maintained:   Monitor labs and assess for signs and symptoms of volume excess or deficit   Monitor intake, output and patient weight   Monitor urine specific gravity, serum osmolarity and serum sodium as indicated or ordered   Monitor response to interventions for patient's volume status, including labs, urine output, blood pressure (other measures as available)   Encourage oral intake as appropriate   Instruct patient on fluid and nutrition restrictions as appropriate

## 2023-02-14 NOTE — PLAN OF CARE
Problem: Discharge Planning  Goal: Discharge to home or other facility with appropriate resources  2/14/2023 1034 by General Naida RN  Outcome: Progressing  Flowsheets  Taken 2/14/2023 1034  Discharge to home or other facility with appropriate resources:   Identify barriers to discharge with patient and caregiver   Identify discharge learning needs (meds, wound care, etc)  Taken 2/14/2023 0804  Discharge to home or other facility with appropriate resources: Identify barriers to discharge with patient and caregiver     Problem: Respiratory - Adult  Goal: Achieves optimal ventilation and oxygenation  2/14/2023 1034 by General Naida RN  Outcome: Progressing  Flowsheets (Taken 2/14/2023 0804)  Achieves optimal ventilation and oxygenation: Assess for changes in respiratory status  2/14/2023 0252 by Red Boyce RN  Outcome: Progressing  Flowsheets (Taken 2/14/2023 0252)  Achieves optimal ventilation and oxygenation:   Assess for changes in respiratory status   Assess for changes in mentation and behavior   Position to facilitate oxygenation and minimize respiratory effort   Oxygen supplementation based on oxygen saturation or arterial blood gases   Initiate smoking cessation protocol as indicated   Assess the need for suctioning and aspirate as needed   Encourage broncho-pulmonary hygiene including cough, deep breathe, incentive spirometry   Assess and instruct to report shortness of breath or any respiratory difficulty   Respiratory therapy support as indicated     Problem: Cardiovascular - Adult  Goal: Maintains optimal cardiac output and hemodynamic stability  2/14/2023 1034 by General Naida RN  Outcome: Progressing  Flowsheets (Taken 2/14/2023 0804)  Maintains optimal cardiac output and hemodynamic stability: Monitor blood pressure and heart rate     Problem: Genitourinary - Adult  Goal: Absence of urinary retention  2/14/2023 1034 by General Naida RN  Outcome: Progressing  Flowsheets (Taken 2/14/2023 1366)  Absence of urinary retention: Assess patients ability to void and empty bladder     Problem: Metabolic/Fluid and Electrolytes - Adult  Goal: Electrolytes maintained within normal limits  2/14/2023 1034 by Steff Nickerson RN  Outcome: Progressing  Flowsheets (Taken 2/14/2023 0804)  Electrolytes maintained within normal limits: Monitor labs and assess patient for signs and symptoms of electrolyte imbalances     Problem: Metabolic/Fluid and Electrolytes - Adult  Goal: Hemodynamic stability and optimal renal function maintained  2/14/2023 1034 by Steff Nickerson RN  Outcome: Progressing  Flowsheets (Taken 2/14/2023 0804)  Hemodynamic stability and optimal renal function maintained: Monitor labs and assess for signs and symptoms of volume excess or deficit     Problem: Safety - Adult  Goal: Free from fall injury  2/14/2023 1034 by Steff Nickerson RN  Outcome: Progressing  Flowsheets (Taken 2/14/2023 0800)  Free From Fall Injury: Instruct family/caregiver on patient safety     Problem: ABCDS Injury Assessment  Goal: Absence of physical injury  2/14/2023 1034 by Steff Nickerson RN  Outcome: Progressing  Flowsheets  Taken 2/14/2023 1034  Absence of Physical Injury: Implement safety measures based on patient assessment  Taken 2/14/2023 0800  Absence of Physical Injury: Implement safety measures based on patient assessment     Problem: Pain  Goal: Verbalizes/displays adequate comfort level or baseline comfort level  2/14/2023 1034 by Steff Nickerson RN  Outcome: Progressing  Flowsheets (Taken 2/14/2023 1034)  Verbalizes/displays adequate comfort level or baseline comfort level:   Encourage patient to monitor pain and request assistance   Administer analgesics based on type and severity of pain and evaluate response

## 2023-02-14 NOTE — CARE COORDINATION
CM following for discharge planning. Lifevest placed. O2 1 L, none at home, continue to wean, may need Home O2 eval if unable to wean. Discharge planned for 2/15/23.     Klarissa Espaan RN, BSN, [address]  Case Management Department  [phone]

## 2023-02-14 NOTE — PROGRESS NOTES
Hospitalist Progress Note      PCP: No primary care provider on file. Date of Admission: 2/8/2023    Chief Complaint: Shortness of breath      Subjective: Chart reviewed. Admitted with acute heart failure with low ejection fraction, NSTEMI and atypical pneumonia. Patient is pleasant, patient mentioned feeling short of breath on exertion,      Medications:  Reviewed    Infusion Medications    sodium chloride       Scheduled Medications    levofloxacin  750 mg IntraVENous Q24H    lisinopril  2.5 mg Oral Daily    furosemide  40 mg Oral Daily    metoprolol succinate  25 mg Oral Daily    sodium chloride flush  5-40 mL IntraVENous 2 times per day    apixaban  10 mg Oral BID    Followed by    Saira Cabrera ON 2/17/2023] apixaban  5 mg Oral BID    sodium chloride flush  5-40 mL IntraVENous 2 times per day    aspirin  81 mg Oral Daily    rosuvastatin  40 mg Oral Nightly     PRN Meds: sodium chloride flush, acetaminophen, sodium chloride flush, sodium chloride, ondansetron **OR** ondansetron, polyethylene glycol      Intake/Output Summary (Last 24 hours) at 2/14/2023 1336  Last data filed at 2/14/2023 1147  Gross per 24 hour   Intake 780 ml   Output 2325 ml   Net -1545 ml       Physical Exam Performed:    BP (!) 91/58   Pulse (!) 102   Temp 97.8 °F (36.6 °C) (Oral)   Resp (!) 8   Ht 5' 11\" (1.803 m)   Wt 195 lb 8.8 oz (88.7 kg)   SpO2 96%   BMI 27.27 kg/m²     General appearance: No apparent distress, appears stated age and cooperative. HEENT: Pupils equal, round, and reactive to light. Conjunctivae/corneas clear. Neck: Supple, with full range of motion. No jugular venous distention. Trachea midline. Respiratory: Minimal basal lung crackles  Cardiovascular: Regular rate and rhythm with normal S1/S2 without murmurs, rubs or gallops. Abdomen: Soft, non-tender, non-distended with normal bowel sounds. Musculoskeletal: No clubbing, cyanosis or edema bilaterally. Full range of motion without deformity.   Skin: Skin color, texture, turgor normal.  No rashes or lesions. Neurologic:  Neurovascularly intact without any focal sensory/motor deficits. Cranial nerves: II-XII intact, grossly non-focal.  Psychiatric: Alert and oriented, thought content appropriate, normal insight  Capillary Refill: Brisk, 3 seconds, normal   Peripheral Pulses: +2 palpable, equal bilaterally       Labs:   Recent Labs     02/12/23 0524 02/13/23 0439 02/14/23 0513   WBC 7.3 8.0 7.6   HGB 16.2 16.1 16.5   HCT 48.8 47.6 49.9    206 217     Recent Labs     02/12/23 0524 02/13/23 0439 02/14/23  0513    135* 136   K 3.6 3.6 3.9   CL 96* 97* 97*   CO2 34* 30 30   BUN 16 17 17   CREATININE 0.9 0.9 1.0   CALCIUM 9.3 9.1 9.5     Recent Labs     02/12/23 0524 02/13/23 0439   AST 31 38*   ALT 20 26   BILITOT 0.7 0.5   ALKPHOS 98 95     No results for input(s): INR in the last 72 hours. No results for input(s): Saran Jakes in the last 72 hours. Urinalysis:    No results found for: Luvenia Kylah, BACTERIA, RBCUA, BLOODU, SPECGRAV, [place] [address]    Radiology:  XR CHEST (2 VW)   Final Result      1. No findings for acute cardiopulmonary disease. CT CHEST WO CONTRAST   Final Result      1. Small foci of groundglass opacity with septal thickening in the left upper lobe and in the middle lobe. Lower lung branching opacities with septal thickening. Findings suggest atypical pneumonia. Clinical correlation recommended. 2. Small pericardial effusion. 3. Nonobstructive left nephrolithiasis. XR CHEST (2 VW)   Final Result   1. No evidence of acute cardiopulmonary disease. XR CHEST 1 VIEW   Final Result   1. There is some hazy groundglass opacity seen projecting over the right midlung. This may reflect some overlying soft tissue attenuation related to rotated patient positioning. Asymmetric pulmonary edema or pneumonitis is a differential consideration.     This could be better characterized with dedicated PA and lateral chest radiograph. IP CONSULT TO CARDIOLOGY  IP CONSULT TO PULMONOLOGY    Assessment/Plan:    Active Hospital Problems    Diagnosis     NICM (nonischemic cardiomyopathy) ([facility] [I42.8]      Priority: Medium    Acute decompensated heart failure ([facility] [I50.9]      Priority: Medium    Acute respiratory failure with hypoxemia (HCC) [J96.01]      Priority: Medium    Mixed hyperlipidemia [E78.2]      Priority: Medium    NSTEMI (non-ST elevated myocardial infarction) ([facility] [I21.4]      Priority: Medium     1. Acute systolic heart failure due to nonischemic cardiomyopathy  -Cardiac cath with nonobstructive CAD. Remote history of cocaine use. Denies use of alcohol or any other illicit drug. UDS was positive only for benzodiazepine.  -Continue diuretic, low-dose lisinopril and beta-blocker.  -Keep on low-salt diet  -Monitor intake output and daily body weight  -Will need LifeVest on discharge and cardiology will arrange for it. Patient is still on oxygen, got chest x-ray, not much phlegm, given the arrhythmogenic potential of Levaquin I will switch the antibiotics to Ceftin    2. Atypical pneumonia on CT chest  -Started on antibiotics on 2/12/2023. Complete total 7 days. Antibiotics changed to Ceftin. 3.  Acute hypoxic respiratory failure due to acute systolic heart failure and pneumonia. 4.  NSTEMI   -Cardiac cath with normal coronaries except possible distal D3 occlusion.  -Continue aspirin, statin  -Cardiology are following. 5.  Left ventricular thrombus  -Continue apixaban and will need on discharge. DVT Prophylaxis: Apixaban  Diet: ADULT DIET; Regular; Low Fat/Low Chol/High Fiber/2 gm Na  Code Status: Full Code  PT/OT Eval Status: No need    Dispo -planning to discharge in the morning, discussed with RN, try to wean oxygen,.         Farrah Cunningham MD

## 2023-02-15 VITALS
DIASTOLIC BLOOD PRESSURE: 64 MMHG | SYSTOLIC BLOOD PRESSURE: 96 MMHG | WEIGHT: 195.33 LBS | HEIGHT: 71 IN | BODY MASS INDEX: 27.35 KG/M2 | TEMPERATURE: 97.8 F | HEART RATE: 102 BPM | RESPIRATION RATE: 17 BRPM | OXYGEN SATURATION: 92 %

## 2023-02-15 PROCEDURE — 6370000000 HC RX 637 (ALT 250 FOR IP): Performed by: INTERNAL MEDICINE

## 2023-02-15 PROCEDURE — 2580000003 HC RX 258: Performed by: INTERNAL MEDICINE

## 2023-02-15 PROCEDURE — 6370000000 HC RX 637 (ALT 250 FOR IP): Performed by: NURSE PRACTITIONER

## 2023-02-15 RX ORDER — CEFUROXIME AXETIL 500 MG/1
500 TABLET ORAL EVERY 12 HOURS SCHEDULED
Qty: 6 TABLET | Refills: 0 | Status: SHIPPED | OUTPATIENT
Start: 2023-02-15 | End: 2023-02-18

## 2023-02-15 RX ORDER — ROSUVASTATIN CALCIUM 40 MG/1
40 TABLET, COATED ORAL NIGHTLY
Qty: 30 TABLET | Refills: 3 | Status: SHIPPED | OUTPATIENT
Start: 2023-02-15

## 2023-02-15 RX ORDER — LISINOPRIL 2.5 MG/1
2.5 TABLET ORAL DAILY
Qty: 30 TABLET | Refills: 3 | Status: SHIPPED | OUTPATIENT
Start: 2023-02-16

## 2023-02-15 RX ORDER — ASPIRIN 81 MG/1
81 TABLET, CHEWABLE ORAL DAILY
Qty: 30 TABLET | Refills: 3 | Status: SHIPPED | OUTPATIENT
Start: 2023-02-16

## 2023-02-15 RX ORDER — FUROSEMIDE 40 MG/1
40 TABLET ORAL DAILY
Qty: 60 TABLET | Refills: 3 | Status: SHIPPED | OUTPATIENT
Start: 2023-02-16

## 2023-02-15 RX ORDER — METOPROLOL SUCCINATE 25 MG/1
25 TABLET, EXTENDED RELEASE ORAL DAILY
Qty: 30 TABLET | Refills: 3 | Status: SHIPPED | OUTPATIENT
Start: 2023-02-16

## 2023-02-15 RX ADMIN — FUROSEMIDE 40 MG: 40 TABLET ORAL at 08:31

## 2023-02-15 RX ADMIN — SODIUM CHLORIDE, PRESERVATIVE FREE 10 ML: 5 INJECTION INTRAVENOUS at 08:32

## 2023-02-15 RX ADMIN — ASPIRIN 81 MG 81 MG: 81 TABLET ORAL at 08:30

## 2023-02-15 RX ADMIN — LISINOPRIL 2.5 MG: 2.5 TABLET ORAL at 08:30

## 2023-02-15 RX ADMIN — CEFUROXIME AXETIL 500 MG: 250 TABLET, FILM COATED ORAL at 08:31

## 2023-02-15 RX ADMIN — APIXABAN 10 MG: 5 TABLET, FILM COATED ORAL at 08:30

## 2023-02-15 RX ADMIN — METOPROLOL SUCCINATE 25 MG: 25 TABLET, FILM COATED, EXTENDED RELEASE ORAL at 08:30

## 2023-02-15 NOTE — DISCHARGE SUMMARY
Pt discharged to home in good condition.  Iv and tele removed.  Avs reviewed and all questions answered at this time.  Educated pt on heart failure diet, daily weights, and importance of finishing the full course of antibiotics.    Electronically signed by Kasey Contreras RN on 2/15/2023 at 12:38 PM

## 2023-02-15 NOTE — DISCHARGE INSTR - COC
Continuity of Care Form    Patient Name: Rocky Basurto   :  1980  MRN:  [id number]    Admit date:  2023  Discharge date:  ***    Code Status Order: Full Code   Advance Directives:     Admitting Physician:  Alberto Becerra MD  PCP: No primary care provider on file. Discharging Nurse: [facility] Unit/Room#: 0305/8372-53  Discharging Unit Phone Number: ***    Emergency Contact:   Extended Emergency Contact Information  Primary Emergency Contact: [address] Phone: [phone]  Mobile Phone: [phone]  Relation: Parent    Past Surgical History:  No past surgical history on file. Immunization History: There is no immunization history on file for this patient.     Active Problems:  Patient Active Problem List   Diagnosis Code    NSTEMI (non-ST elevated myocardial infarction) ([facility] I21.4    Acute respiratory failure with hypoxemia (HCC) J96.01    Mixed hyperlipidemia E78.2    NICM (nonischemic cardiomyopathy) ([facility] I42.8    Acute decompensated heart failure (HCC) I50.9       Isolation/Infection:   Isolation            No Isolation          Patient Infection Status       Infection Onset Added Last Indicated Last Indicated By Review Planned Expiration Resolved Resolved By    None active    Resolved    COVID-19 (Rule Out) 23 Respiratory Panel, Molecular, with COVID-19 (Restricted: peds pts or suitable admitted adults) (Ordered)   02/10/23 Rule-Out Test Resulted            Nurse Assessment:  Last Vital Signs: BP 96/64   Pulse (!) 102   Temp 97.8 °F (36.6 °C) (Oral)   Resp 17   Ht 5' 11\" (1.803 m)   Wt 195 lb 5.2 oz (88.6 kg)   SpO2 92%   BMI 27.24 kg/m²     Last documented pain score (0-10 scale): Pain Level: 0  Last Weight:   Wt Readings from Last 1 Encounters:   02/15/23 195 lb 5.2 oz (88.6 kg)     Mental Status:  {IP PT MENTAL STATUS:}    IV Access:  { PAKO IV ACCESS:897702671}    Nursing Mobility/ADLs:  Walking   {[facility] DME LMYU:672139954}  Transfer  {[facility] DME IPHO:923305841}  Bathing  {CH[facility] DME OSVR:880480149}  Dressing  {[facility] DME SYGV:997704738}  Toileting  {[facility] DME XSBC:401076208}  Feeding  {[facility]P DME JNPS:330155438}  Med Admin  {[facility] DME RPCW:472507739}  Med Delivery   { PAKO MED Delivery:282154454}    Wound Care Documentation and Therapy:        Elimination:  Continence: Bowel: {YES / JX:50159}  Bladder: {YES / PA:62142}  Urinary Catheter: {Urinary Catheter:200324122}   Colostomy/Ileostomy/Ileal Conduit: {YES / JT:07072}       Date of Last BM: ***    Intake/Output Summary (Last 24 hours) at 2/15/2023 0915  Last data filed at 2/15/2023 0825  Gross per 24 hour   Intake 920 ml   Output 1220 ml   Net -300 ml     I/O last 3 completed shifts:   In: 2999 [P.O.:1460]  Out: 2445 [Urine:2445]    Safety Concerns:     [employer] Safety Concerns:987653202}    Impairments/Disabilities:      [address] [employer] Impairments/Disabilities:523668751}    Nutrition Therapy:  Current Nutrition Therapy:   [employer] Diet List:718625994}    Routes of Feeding: {CHP DME Other Feedings:007966716}  Liquids: {Slp liquid thickness:16854}  Daily Fluid Restriction: {CHP DME Yes amt example:560241528}  Last Modified Barium Swallow with Video (Video Swallowing Test): {Done Not Done JLYD:193944146}    Treatments at the Time of Hospital Discharge:   Respiratory Treatments: ***  Oxygen Therapy:  {Therapy; copd oxygen:72706}  Ventilator:    { CC Vent BWMU:467612837}    Rehab Therapies: {THERAPEUTIC INTERVENTION:7766513277}  Weight Bearing Status/Restrictions: [address] [employer] Weight Bearin}  Other Medical Equipment (for information only, NOT a DME order):  {EQUIPMENT:046457018}  Other Treatments: ***    Patient's personal belongings (please select all that are sent with patient):  {P DME Belongings:186116316}    RN SIGNATURE:  {Esignature:158739762}    CASE MANAGEMENT/SOCIAL WORK SECTION    Inpatient Status Date: ***    Readmission Risk Assessment Score:  Readmission Risk Risk of Unplanned Readmission:  6           Discharging to Facility/ Agency   Name:   Address:  Phone:  Fax:    Dialysis Facility (if applicable)   Name:  Address:  Dialysis Schedule:  Phone:  Fax:    / signature: {Esignature:433670039}    PHYSICIAN SECTION    Prognosis: {Prognosis:3774423025}    Condition at Discharge: Guillermo Hinton Patient Condition:340514324}    Rehab Potential (if transferring to Rehab): {Prognosis:2444974257}    Recommended Labs or Other Treatments After Discharge: ***    Physician Certification: I certify the above information and transfer of Laurita Kay  is necessary for the continuing treatment of the diagnosis listed and that he requires {Admit to Appropriate Level of Care:72196} for {GREATER/LESS:230554443} 30 days.      Update Admission H&P: {CHP DME Changes in YCHY}    PHYSICIAN SIGNATURE:  {Esignature:769060858}

## 2023-02-15 NOTE — PLAN OF CARE
Problem: Discharge Planning  Goal: Discharge to home or other facility with appropriate resources  2/15/2023 0852 by Judie Matos RN  Outcome: Progressing  Flowsheets (Taken 2/15/2023 4638)  Discharge to home or other facility with appropriate resources:   Identify barriers to discharge with patient and caregiver   Identify discharge learning needs (meds, wound care, etc)     Problem: Respiratory - Adult  Goal: Achieves optimal ventilation and oxygenation  2/15/2023 0852 by Judie Matos RN  Outcome: Progressing     Problem: Cardiovascular - Adult  Goal: Maintains optimal cardiac output and hemodynamic stability  2/15/2023 0852 by Judie Matos RN  Outcome: Progressing     Problem: Cardiovascular - Adult  Goal: Absence of cardiac dysrhythmias or at baseline  2/15/2023 0852 by Judie Matos RN  Outcome: Progressing     Problem: Metabolic/Fluid and Electrolytes - Adult  Goal: Electrolytes maintained within normal limits  2/15/2023 0852 by Judie Matos RN  Outcome: Progressing     Problem: Metabolic/Fluid and Electrolytes - Adult  Goal: Hemodynamic stability and optimal renal function maintained  2/15/2023 0852 by Judie Matos RN  Outcome: Progressing     Problem: Safety - Adult  Goal: Free from fall injury  2/15/2023 0852 by Judie Matos RN  Outcome: Progressing  Flowsheets  Taken 2/15/2023 0852  Free From Fall Injury: Instruct family/caregiver on patient safety  Taken 2/15/2023 0825  Free From Fall Injury: Instruct family/caregiver on patient safety     Problem: ABCDS Injury Assessment  Goal: Absence of physical injury  2/15/2023 0852 by Judie Matos RN  Outcome: Progressing  Flowsheets (Taken 2/15/2023 0825)  Absence of Physical Injury: Implement safety measures based on patient assessment     Problem: Pain  Goal: Verbalizes/displays adequate comfort level or baseline comfort level  2/15/2023 0852 by Judie Matos RN  Outcome: Progressing  Flowsheets (Taken 2/15/2023 8623)  Verbalizes/displays adequate comfort level or baseline comfort level:   Encourage patient to monitor pain and request assistance   Administer analgesics based on type and severity of pain and evaluate response

## 2023-02-15 NOTE — PLAN OF CARE
Problem: Discharge Planning  Goal: Discharge to home or other facility with appropriate resources  Outcome: Progressing  Flowsheets (Taken 2/15/2023 0108)  Discharge to home or other facility with appropriate resources:   Identify barriers to discharge with patient and caregiver   Arrange for needed discharge resources and transportation as appropriate   Identify discharge learning needs (meds, wound care, etc)   Arrange for interpreters to assist at discharge as needed   Refer to discharge planning if patient needs post-hospital services based on physician order or complex needs related to functional status, cognitive ability or social support system     Problem: Respiratory - Adult  Goal: Achieves optimal ventilation and oxygenation  Outcome: Progressing  Flowsheets (Taken 2/15/2023 0108)  Achieves optimal ventilation and oxygenation:   Assess for changes in respiratory status   Assess for changes in mentation and behavior   Position to facilitate oxygenation and minimize respiratory effort   Oxygen supplementation based on oxygen saturation or arterial blood gases   Initiate smoking cessation protocol as indicated   Encourage broncho-pulmonary hygiene including cough, deep breathe, incentive spirometry   Assess the need for suctioning and aspirate as needed   Assess and instruct to report shortness of breath or any respiratory difficulty   Respiratory therapy support as indicated     Problem: Cardiovascular - Adult  Goal: Maintains optimal cardiac output and hemodynamic stability  Outcome: Progressing  Flowsheets (Taken 2/15/2023 0108)  Maintains optimal cardiac output and hemodynamic stability:   Monitor urine output and notify Licensed Independent Practitioner for values outside of normal range   Monitor blood pressure and heart rate   Assess for signs of decreased cardiac output   Administer fluid and/or volume expanders as ordered   Administer vasoactive medications as ordered     Problem: Cardiovascular - Adult  Goal: Absence of cardiac dysrhythmias or at baseline  Outcome: Progressing  Flowsheets (Taken 2/15/2023 0108)  Absence of cardiac dysrhythmias or at baseline:   Monitor cardiac rate and rhythm   Assess for signs of decreased cardiac output   Administer antiarrhythmia medication and electrolyte replacement as ordered     Problem: Genitourinary - Adult  Goal: Absence of urinary retention  Outcome: Progressing  Flowsheets (Taken 2/15/2023 0108)  Absence of urinary retention:   Assess patients ability to void and empty bladder   Monitor intake/output and perform bladder scan as needed   Place urinary catheter per Licensed Independent Practitioner order if needed   Discuss with Licensed Independent Practitioner  medications to alleviate retention as needed   Discuss catheterization for long term situations as appropriate     Problem: Safety - Adult  Goal: Free from fall injury  Outcome: Progressing  Flowsheets (Taken 2/15/2023 0108)  Free From Fall Injury: Instruct family/caregiver on patient safety     Problem: ABCDS Injury Assessment  Goal: Absence of physical injury  Outcome: Progressing  Flowsheets (Taken 2/15/2023 0108)  Absence of Physical Injury: Implement safety measures based on patient assessment     Problem: Pain  Goal: Verbalizes/displays adequate comfort level or baseline comfort level  Outcome: Progressing  Flowsheets (Taken 2/15/2023 0108)  Verbalizes/displays adequate comfort level or baseline comfort level:   Assess pain using appropriate pain scale   Encourage patient to monitor pain and request assistance   Administer analgesics based on type and severity of pain and evaluate response   Implement non-pharmacological measures as appropriate and evaluate response   Consider cultural and social influences on pain and pain management   Notify Licensed Independent Practitioner if interventions unsuccessful or patient reports new pain     Problem: Metabolic/Fluid and Electrolytes - Adult  Goal: Electrolytes maintained within normal limits  Outcome: Progressing  Flowsheets (Taken 2/15/2023 0108)  Electrolytes maintained within normal limits:   Monitor labs and assess patient for signs and symptoms of electrolyte imbalances   Administer electrolyte replacement as ordered   Monitor response to electrolyte replacements, including repeat lab results as appropriate   Fluid restriction as ordered   Instruct patient on fluid and nutrition restrictions as appropriate     Problem: Metabolic/Fluid and Electrolytes - Adult  Goal: Hemodynamic stability and optimal renal function maintained  Outcome: Progressing  Flowsheets (Taken 2/15/2023 0108)  Hemodynamic stability and optimal renal function maintained:   Monitor labs and assess for signs and symptoms of volume excess or deficit   Monitor intake, output and patient weight   Monitor urine specific gravity, serum osmolarity and serum sodium as indicated or ordered   Monitor response to interventions for patient's volume status, including labs, urine output, blood pressure (other measures as available)   Encourage oral intake as appropriate   Instruct patient on fluid and nutrition restrictions as appropriate

## 2023-02-15 NOTE — DISCHARGE SUMMARY
HOSPITALISTS DISCHARGE SUMMARY    Patient Demographics    PatientSurekha Gordon  Date of Birth. 1980  MRN. [id number]     Primary care provider. No primary care provider on file. (Tel: None)    Admit date: 2/8/2023    Discharge date (blank if same as Note Date): Note Date: 2/15/2023     Reason for Hospitalization. No chief complaint on file. Significant Findings. Active Problems:    NSTEMI (non-ST elevated myocardial infarction) ([address]    Acute respiratory failure with hypoxemia (HCC)    Mixed hyperlipidemia    NICM (nonischemic cardiomyopathy) ([facility] [address]    Acute decompensated heart failure (HCC)  Resolved Problems:    * No resolved hospital problems. *       Problems and results from this hospitalization that need follow up. NSTEMI    Significant test results and incidental findings. XR CHEST (2 VW)   Final Result      1. No findings for acute cardiopulmonary disease. CT CHEST WO CONTRAST   Final Result      1. Small foci of groundglass opacity with septal thickening in the left upper lobe and in the middle lobe. Lower lung branching opacities with septal thickening. Findings suggest atypical pneumonia. Clinical correlation recommended. 2. Small pericardial effusion. 3. Nonobstructive left nephrolithiasis. XR CHEST (2 VW)   Final Result   1. No evidence of acute cardiopulmonary disease. XR CHEST 1 VIEW   Final Result   1. There is some hazy groundglass opacity seen projecting over the right midlung. This may reflect some overlying soft tissue attenuation related to rotated patient positioning. Asymmetric pulmonary edema or pneumonitis is a differential consideration. This could be better characterized with dedicated PA and lateral chest radiograph. Conclusions      Summary   Severe Left ventricular enlargement. Severely decreased left ventricular systolic function with an estimated   ejection fraction of <20%. Global hypokinesis.    Abnormal diastolic filling pattern. There appear to be multiple thrombi within the left ventricular apex. The mitral valve is normal in structure. Mild mitral regurgitation. No evidence of mitral stenosis. The left atrium is dilated. Thickened aortic valve leaflets. There is no significant aortic valve regurgitation or stenosis. The aortic root is mildly dilated measuring 3.8 cm. The right ventricle appears dilated with decreased systolic function. Tapse: 2.09 cm. RV s: 12.2 cm/s   The tricuspid valve is normal in structure. Mild tricuspid regurgitation. No evidence of tricuspid stenosis. IVC size is normal (<2.1cm) and collapses > 50% with respiration consistent   with normal RA pressure (3mmHg). Estimated pulmonary artery systolic pressure is at 45 mmHg assuming a right   atrial pressure of 3 mmHg. Definity contrast agent was used to help visualize endocardial borders. Invasive procedures and treatments. HEMODYNAMIC / ANGIOGRAPHIC DATA:    RA 7/5, 3  RV 31/1, 5  PA 32/12, 22  PW 12     Left ventricular end diastolic pressure was 19 mmHg. There was no gradient across the aortic valve upon pullback. The left main coronary artery arises from the left coronary cusp giving rise to the left anterior descending artery and the left circumflex artery. The left main reveals normal coronaries. Distal D3 appears occluded. The left anterior descending artery arises in normal fashion from left coronary giving rise to diagonals and septal branches. The LAD reveals normal coronaries. The left circumflex/OM system reveals normal coronaries. The right coronary artery is a right dominant vessel with no significant stenosis. CONCLUSIONS:  Normal coronaries  2. Probably occluded distal D3 due to embolization  3.    Low right and left sided pressure/normal co/ci        RECOMMENDATIONS:    Routine post cardiac catheterization care  Aggressive risk factor modification     [facility] Course. Patient was transferred from [facility]. Patient was admitted with a diagnosis of non-STEMI. Patient did had acute hypoxic respiratory failure which at thought to be related to acute systolic heart failure. EF was less than 20%. Patient did underwent angiogram which showed normal coronaries other than possible distal D3 embolic phenomena. Patient did had a left ventricular thrombus. Patient did require diuresis for acute heart failure. Pulmonary was also consulted during the stay along with cardiology. Patient will be discharged home with LifeVest.  Patient is on maximal cardiac medications. Patient need to follow-up with cardiology as might be a candidate for ICD if EF does not improve after maximal medical therapy. The patient was also treated for atypical pneumonia complete course with Ceftin. Drug screen was negative. 1.  Acute systolic heart failure due to nonischemic cardiomyopathy  -Cardiac cath with nonobstructive CAD. Remote history of cocaine use. Denies use of alcohol or any other illicit drug. UDS was positive only for benzodiazepine.  -Continue diuretic, low-dose lisinopril and beta-blocker.  -Keep on low-salt diet  -Monitor intake output and daily body weight  -Will need LifeVest on discharge and cardiology will arrange for it. Patient is still on oxygen, got chest x-ray, not much phlegm, given the arrhythmogenic potential of Levaquin I will switch the antibiotics to Ceftin     2. Atypical pneumonia on CT chest  -Started on antibiotics on 2/12/2023. Complete total 7 days. Antibiotics changed to Ceftin. 3.  Acute hypoxic respiratory failure due to acute systolic heart failure and pneumonia. 4.  NSTEMI   -Cardiac cath with normal coronaries except possible distal D3 occlusion.  -Continue aspirin, statin  -Cardiology are following. 5.  Left ventricular thrombus  -Continue apixaban and will need on discharge. Consults.   IP CONSULT TO CARDIOLOGY  IP CONSULT TO PULMONOLOGY    Physical examination on discharge day. BP 96/64   Pulse (!) 102   Temp 97.8 °F (36.6 °C) (Oral)   Resp 17   Ht 5' 11\" (1.803 m)   Wt 195 lb 5.2 oz (88.6 kg)   SpO2 92%   BMI 27.24 kg/m²   General appearance. Alert. Looks comfortable. HEENT. Sclera clear. Moist mucus membranes. Cardiovascular. Regular rate and rhythm, normal S1, S2. No murmur. Respiratory. Not using accessory muscles. Clear to auscultation bilaterally, no wheeze. Gastrointestinal. Abdomen soft, non-tender, not distended, normal bowel sounds  Neurology. Facial symmetry. No speech deficits. Moving all extremities equally. Extremities. No edema in lower extremities. Skin. Warm, dry, normal turgor        Condition at time of discharge stable     Medication instructions provided to patient at discharge. Medication List        START taking these medications      * apixaban 5 MG Tabs tablet  Commonly known as: ELIQUIS  Take 2 tablets by mouth 2 times daily for 4 doses     * apixaban 5 MG Tabs tablet  Commonly known as: ELIQUIS  Take 1 tablet by mouth 2 times daily  Start taking on: February 17, 2023     aspirin 81 MG chewable tablet  Take 1 tablet by mouth daily  Start taking on: February 16, 2023     cefUROXime 500 MG tablet  Commonly known as: CEFTIN  Take 1 tablet by mouth every 12 hours for 3 days     furosemide 40 MG tablet  Commonly known as: LASIX  Take 1 tablet by mouth daily  Start taking on: February 16, 2023     lisinopril 2.5 MG tablet  Commonly known as: PRINIVIL;ZESTRIL  Take 1 tablet by mouth daily  Start taking on: February 16, 2023     metoprolol succinate 25 MG extended release tablet  Commonly known as: TOPROL XL  Take 1 tablet by mouth daily  Start taking on: February 16, 2023     rosuvastatin 40 MG tablet  Commonly known as: CRESTOR  Take 1 tablet by mouth nightly           * This list has 2 medication(s) that are the same as other medications prescribed for you.  Read the directions carefully, and ask your doctor or other care provider to review them with you. STOP taking these medications      amLODIPine 5 MG tablet  Commonly known as: NORVASC     ibuprofen 600 MG tablet  Commonly known as: IBU               Where to Get Your Medications        These medications were sent to [place], [address] Pramod Woodall [phone] - F [phone]  [address] [facility] [address]      Phone: [phone]   apixaban 5 MG Tabs tablet  apixaban 5 MG Tabs tablet  aspirin 81 MG chewable tablet  cefUROXime 500 MG tablet  furosemide 40 MG tablet  lisinopril 2.5 MG tablet  metoprolol succinate 25 MG extended release tablet  rosuvastatin 40 MG tablet         Discharge recommendations given to patient. Follow Up. Primary care provider and cardiology  Disposition. home  Activity. activity as tolerated  Diet: ADULT DIET; Regular; Low Fat/Low Chol/High Fiber/2 gm Na      Spent 40 minutes in discharge process.     Signed:  Joni Crawford MD     2/15/2023 9:16 AM

## 2023-02-15 NOTE — ADT AUTH CERT
Myocardial Infarction - Care Day 6 (2/13/2023) by Reena Martinez RN       Review Status Review Entered   Completed 2/15/2023 1213       Created By   Reena Martinez RN      Criteria Review      Care Day: 6 Care Date: 2/13/2023 Level of Care: Intermediate Care    Guideline Day 3    Clinical Status    ( ) * Hemodynamic stability    2/15/2023 12:13 PM EST by Chris Cevallos      02/13/23 1128 97.8 (36.6) 18 111Abnormal 96/68 -- Semi fowlers -- 1 90 Nasal cannula    02/13/23 2126 98.5 (36.9) 18 118Abnormal 103/66 -- Semi fowlers -- 1 95 Nasal cannula    (X) * Chest pain, dyspnea, or anginal equivalent absent    (X) * No evidence of bleeding    (X) * No evidence of recurrent myocardial ischemia    (X) * Dangerous arrhythmia absent    (X) * Vascular access site without evidence of infection, aneurysm, or growing hematoma    (X) * Renal function at baseline or acceptable for next level of care    2/15/2023 12:13 PM EST by Dina Alexis      Sodium 135    (X) * Discharge plans and education understood    Activity    (X) * Ambulatory or acceptable for next level of care    Routes    (X) * Oral hydration    ( ) * Oral medications or regimen acceptable for next level of care    2/15/2023 12:13 PM EST by Dina Casanova      levaquin iv    (X) * Oral diet or acceptable for next level of care    Medications    (X) * Anticoagulants absent    * Milestone   Additional Notes   DATE: 2/13      PERTINENT UPDATES:   still on oxygen. Still hypoxic . Occasional cough    Persistent tachycardia   Levaquin IV      VITALS:   02/13/23 1128 97.8 (36.6) 18 111Abnormal 96/68 - Semi fowlers - 1 90 Nasal cannula     02/13/23 2126 98.5 (36.9) 18 118Abnormal 103/66 - Semi fowlers - 1 95 Nasal cannula       ABNL/PERTINENT LABS/RADIOLOGY/DIAGNOSTIC STUDIES:     Sodium 135       PHYSICAL EXAM:   Bilateral basal crackles   Pascual      MD CONSULTS/ASSESSMENT AND PLAN:   1.   Acute systolic heart failure due to nonischemic cardiomyopathy   -Cardiac cath with nonobstructive CAD. Remote history of cocaine use. Denies use of alcohol or any other illicit drug. UDS was positive only for benzodiazepine.   -Continue diuretic, low-dose lisinopril and beta-blocker.   -Keep on low-salt diet   -Monitor intake output and daily body weight   -Will need LifeVest on discharge and cardiology will arrange for it. 2.  Atypical pneumonia on CT chest   -Started on antibiotics on 2/12/2023. Complete total 7 days. 3.  Acute hypoxic respiratory failure due to acute systolic heart failure and pneumonia. 4.  NSTEMI    -Cardiac cath with normal coronaries except possible distal D3 occlusion.   -Continue aspirin, statin   -Cardiology are following. 5.  Left ventricular thrombus   -Continue apixaban and will need on discharge.       CARDIO:   Acute systolic heart failure LVEF <20%   Atypical Pneumonia   NSTEMI   Apical thrombus   Nonischemic cardiomyopathy   Coronary embolus       Continue betablocker, diuretic, lisinopril   BP on low side for further med titration including ARNI/MRA; SGLT2I consideration as outpatient   Continue quinolone, monitor for arrhythmias (high risk med)   LifeVest   Continue eliquis       MEDICATIONS:      levoFLOXacin (LEVAQUIN) 750 MG/150ML infusion 750 mg   Dose: 750 mg   Freq: EVERY 24 HOURS Route: IV   Last Dose: Stopped (02/14/23 1034)   Start: 02/12/23 0945 End: 02/14/23 1338        Myocardial Infarction - Care Day 3 (2/10/2023) by Kd Stone RN       Review Status Review Entered   Completed 2/15/2023 1200       Created By   Kd Stone RN      Criteria Review      Care Day: 3 Care Date: 2/10/2023 Level of Care: Intermediate Care    Guideline Day 2    Clinical Status    (X) * Hemodynamic stability    2/15/2023 11:59 AM EST by Dina Diaz      /70   Pulse (!) 107   Temp 98.9 °F (37.2 °C) (Oral)   Resp 20   Ht 5' 11\" (1.803 m)    (X) * Chest pain, dyspnea, or anginal equivalent absent    Activity    (X) Activity as tolerated Routes    (X) * Oral hydration    (X) * Oral medications    2/15/2023 11:59 AM EST by Dina Cordon      lasix iv    (X) Parenteral medications    Interventions    (X) * Oxygen absent    (X) ECG, cardiac biomarkers    2/15/2023 11:59 AM EST by Dina Casanova      co2 36    (X) Possible cardiac angiography, with percutaneous coronary intervention if indicated    Medications    (X) * IV nitroglycerin absent    (X) Anticoagulants    2/15/2023 11:59 AM EST by Dina Cordon      heparin gtt then stopped    (X) Antiplatelet agents (eg, aspirin, clopidogrel, ticagrelor)    (X) Beta-blocker    (X) Statin    * Milestone   Additional Notes   DATE: 2/10      PERTINENT UPDATES:   Cath was canceled yesterday as patient became hypoxic on lying flat. Cath today. patient did had a significant low EF, given his low diastolic pressure and still hypoxic needs a pulmonary eval   Remains on 4 liter oxygen. Tachycardic      VITALS:    /70   Pulse (!) 107   Temp 98.9 °F (37.2 °C) (Oral)   Resp 20   96%      ABNL/PERTINENT LABS/RADIOLOGY/DIAGNOSTIC STUDIES:   CATH:   CONCLUSIONS:   1. Normal coronaries   2. Probably occluded distal D3 due to embolization   3. Low right and left sided pressure/normal co/ci    RECOMMENDATIONS:     1. Routine post cardiac catheterization care   2. Aggressive risk factor modification      PHYSICAL EXAM:   02/10/23 209 lb 14.1 oz (95.2 kg)   SANCHEZ   tachycardia   Respiratory:  diminished      MD CONSULTS/ASSESSMENT AND PLAN:      CARDIO:   Assessment:   40 y.o. with chest pain   Issues:   -NSTEMI   -Hypoxemic respiratory failure   -PENG   -LV dysfunction    -LV thrombi        Plan:   -Right and left heart cath planned for this afternoon if pt can lay flat    -Heparin gtt    -Crestor   -ASA   -Toprol increased to 25 mg po daily    -Lasix IV BID   -CBC, CMP daily      PULM:   - No acute pulmonary process. His SOB is related to his CHF.       MEDICINE:   Persistent hypoxia   Patient admitted to significant diuresis since yesterday, patient CT PE done at [facility] was negative, Cardiology Did and had normal coronaries and end-diastolic pressures were also on the lower side, given unexplained hypoxia pulmonary recommendation is recommended we did consult pulmonary,   We will check urine drug screen        NSTEMI   S/p cath    Asa/statin heparin drip, cardiology consulted   Coronaries normal except possible Distal D3 occlusion,  will check urine drug screen,       Apical thrombi   Heparin drip       Concerns of heart failure on outside hospital   Follow-up on echocardiogram       Patient does mention that he was on dialysis for about 6 months, renal function stable      Tobacco abuse      MEDICATIONS:      heparin 25,000 units in dextrose 5% 250 mL (premix) infusion   Rate: 4.9-29.4 mL/hr Dose: 5-30 Units/kg/hr   Weight Dosing Info: 98 kg   Freq: CONTINUOUS Route: IV   Last Dose: 18 Units/kg/hr (02/10/23 1535)   Start: 02/08/23 1645 End: 02/10/23 2100      furosemide (LASIX) injection 40 mg   Dose: 40 mg   X 1

## 2023-02-20 ENCOUNTER — FOLLOWUP TELEPHONE ENCOUNTER (OUTPATIENT)
Dept: INPATIENT UNIT | Age: 43
End: 2023-02-20

## 2023-02-20 NOTE — PROGRESS NOTES
Attempted again to contact patient re: hospital follow up appointment. Messages left Fri 2/17, Sat 2/18, and today 2/20. Requested call back. Pt is from [facility] and there is concern of his ability to follow up at [facility] location. Writer is trying to offer scheduling at [facility] location if pt can be reached.

## 2023-02-21 ENCOUNTER — OFFICE VISIT (OUTPATIENT)
Dept: CARDIOLOGY CLINIC | Age: 43
End: 2023-02-21
Payer: COMMERCIAL

## 2023-02-21 VITALS
HEIGHT: 71 IN | SYSTOLIC BLOOD PRESSURE: 102 MMHG | HEART RATE: 116 BPM | DIASTOLIC BLOOD PRESSURE: 32 MMHG | OXYGEN SATURATION: 91 % | BODY MASS INDEX: 27.58 KG/M2 | WEIGHT: 197 LBS

## 2023-02-21 DIAGNOSIS — I21.4 NSTEMI (NON-ST ELEVATION MYOCARDIAL INFARCTION) (HCC): ICD-10-CM

## 2023-02-21 DIAGNOSIS — I25.2 H/O NON-ST ELEVATION MYOCARDIAL INFARCTION (NSTEMI): ICD-10-CM

## 2023-02-21 DIAGNOSIS — I10 HYPERTENSION, UNSPECIFIED TYPE: ICD-10-CM

## 2023-02-21 DIAGNOSIS — I51.3 LEFT VENTRICULAR APICAL THROMBUS: ICD-10-CM

## 2023-02-21 DIAGNOSIS — I50.22 CHRONIC SYSTOLIC HEART FAILURE (HCC): Primary | ICD-10-CM

## 2023-02-21 PROCEDURE — G8419 CALC BMI OUT NRM PARAM NOF/U: HCPCS | Performed by: INTERNAL MEDICINE

## 2023-02-21 PROCEDURE — 1111F DSCHRG MED/CURRENT MED MERGE: CPT | Performed by: INTERNAL MEDICINE

## 2023-02-21 PROCEDURE — 3074F SYST BP LT 130 MM HG: CPT | Performed by: INTERNAL MEDICINE

## 2023-02-21 PROCEDURE — 4004F PT TOBACCO SCREEN RCVD TLK: CPT | Performed by: INTERNAL MEDICINE

## 2023-02-21 PROCEDURE — G8427 DOCREV CUR MEDS BY ELIG CLIN: HCPCS | Performed by: INTERNAL MEDICINE

## 2023-02-21 PROCEDURE — 3078F DIAST BP <80 MM HG: CPT | Performed by: INTERNAL MEDICINE

## 2023-02-21 PROCEDURE — G8484 FLU IMMUNIZE NO ADMIN: HCPCS | Performed by: INTERNAL MEDICINE

## 2023-02-21 PROCEDURE — 99214 OFFICE O/P EST MOD 30 MIN: CPT | Performed by: INTERNAL MEDICINE

## 2023-02-21 NOTE — PROGRESS NOTES
[facility]     Outpatient Cardiology         Patient Name:  Sarai Garvin  Requesting Physician: No admitting provider for patient encounter. Primary Care Physician: No primary care provider on file. Reason for Consultation/Chief Complaint:   Chief Complaint   Patient presents with    Follow-Up from Hospital         HPI:     40year old male with history of CHF with LVEF <20%, cardiomyopathy, HTN, CKD, presents for hospital follow up. He is wearing his Lifevest.  BP at home is well controlled. Patient denies chest pain, sob, orthopnea, pnd, edema, and palpitations. Patient admitted to [facility] 2/8/23-2/15/23 with c/o SOB and chest pain. Found to have NSTEMI and coronary embolus. Underwent LHC, no intervention. LVEF <20% discharged with Lifevest.      LHC, 2/10/23, Normal coronaries. Probably occluded distal D3 due to embolization. Low right and left sided pressure/normal co/ci. Tobacco use. Histories:     Past Medical History:   has a past medical history of Chronic kidney disease (CKD), Hemodialysis patient ([facility] [address] and Hypertension. Surgical History:   has no past surgical history on file. Social History:   reports that he has been smoking cigarettes. He has a 22.00 pack-year smoking history. He has been exposed to tobacco smoke. He has never used smokeless tobacco. He reports that he does not drink alcohol and does not use drugs. Family History:  No evidence for sudden cardiac death or premature CAD    Medications:     Home Medications:  Were reviewed and are listed in nursing record. and/or listed below    Prior to Admission medications    Medication Sig Start Date End Date Taking?  Authorizing Provider   aspirin 81 MG chewable tablet Take 1 tablet by mouth daily 2/16/23  Yes Amy Webster MD   apixaban (ELIQUIS) 5 MG TABS tablet Take 1 tablet by mouth 2 times daily 2/17/23  Yes Amy Webster MD   rosuvastatin (CRESTOR) 40 MG tablet Take 1 tablet by mouth nightly 2/15/23  Yes Amy Davis MD   lisinopril (PRINIVIL;ZESTRIL) 2.5 MG tablet Take 1 tablet by mouth daily 2/16/23  Yes Amy Davis MD   metoprolol succinate (TOPROL XL) 25 MG extended release tablet Take 1 tablet by mouth daily 2/16/23  Yes Amy Davis MD   furosemide (LASIX) 40 MG tablet Take 1 tablet by mouth daily 2/16/23  Yes Ariella Burroughs MD        Allergy:     Patient has no known allergies. Review of Systems:     Review of Systems    Physical Examination:     Vitals:    02/21/23 1525   BP: (!) 102/32   Pulse: (!) 116   SpO2: 91%   Weight: 197 lb (89.4 kg)   Height: 5' 11\" (1.803 m)       Wt Readings from Last 3 Encounters:   02/21/23 197 lb (89.4 kg)   02/15/23 195 lb 5.2 oz (88.6 kg)       Physical Exam  Constitutional:       Appearance: Normal appearance. HENT:      Head: Normocephalic and atraumatic. Nose: Nose normal.   Eyes:      Conjunctiva/sclera: Conjunctivae normal.   Cardiovascular:      Rate and Rhythm: Normal rate and regular rhythm. Heart sounds: Normal heart sounds. Pulmonary:      Effort: Pulmonary effort is normal.      Breath sounds: Normal breath sounds. Abdominal:      Palpations: Abdomen is soft. Musculoskeletal:      Cervical back: Neck supple. Skin:     General: Skin is warm and dry. Neurological:      General: No focal deficit present. Mental Status: He is alert.          Labs:     Lab Results   Component Value Date    WBC 7.6 02/14/2023    HGB 16.5 02/14/2023    HCT 49.9 02/14/2023    MCV 92.1 02/14/2023     02/14/2023     Lab Results   Component Value Date     02/14/2023    K 3.9 02/14/2023    CL 97 (L) 02/14/2023    CO2 30 02/14/2023    BUN 17 02/14/2023    CREATININE 1.0 02/14/2023    GLUCOSE 95 02/14/2023    CALCIUM 9.5 02/14/2023    PROT 6.9 02/13/2023    LABALBU 3.7 02/13/2023    BILITOT 0.5 02/13/2023    ALKPHOS 95 02/13/2023    AST 38 (H) 02/13/2023    ALT 26 02/13/2023    LABGLOM >60 02/14/2023    AGRATIO 1.2 02/13/2023         Lab Results   Component Value Date    CHOL 155 02/09/2023     Lab Results   Component Value Date    TRIG 70 02/09/2023     Lab Results   Component Value Date    HDL 25 (L) 02/09/2023     Lab Results   Component Value Date    LDLCALC 116 (H) 02/09/2023     Lab Results   Component Value Date    LABVLDL 14 02/09/2023     No results found for: [facility]    Lab Results   Component Value Date    INR 1.01 02/08/2023    PROTIME 13.2 02/08/2023       The ASCVD Risk score (Laura MORALES, et al., 2019) failed to calculate for the following reasons: The patient has a prior MI or stroke diagnosis      Imaging:       ECG (if available, Personally interpreted):    Last Monitor/Holter (if available):    Last Stress (if available):    Last Cath : 2/10/23  HEMODYNAMIC / ANGIOGRAPHIC DATA:    1. RA 7/5, 3  2. RV 31/1, 5  3. PA 32/12, 22  4. PW 12     5. Left ventricular end diastolic pressure was 19 mmHg. There was no gradient across the aortic valve upon pullback. 6. The left main coronary artery arises from the left coronary cusp giving rise to the left anterior descending artery and the left circumflex artery. The left main reveals normal coronaries. Distal D3 appears occluded. 7. The left anterior descending artery arises in normal fashion from left coronary giving rise to diagonals and septal branches. The LAD reveals normal coronaries. 8. The left circumflex/OM system reveals normal coronaries. 9. The right coronary artery is a right dominant vessel with no significant stenosis. CONCLUSIONS:  1. Normal coronaries  2. Probably occluded distal D3 due to embolization  3. Low right and left sided pressure/normal co/ci      RECOMMENDATIONS:    1. Routine post cardiac catheterization care  2. Aggressive risk factor modification    Last TTE/SAMANTHA : 2/9/23  Conclusions    Summary   Severe Left ventricular enlargement.    Severely decreased left ventricular systolic function with an estimated   ejection fraction of <20%. Global hypokinesis. Abnormal diastolic filling pattern. There appear to be multiple thrombi within the left ventricular apex. The mitral valve is normal in structure. Mild mitral regurgitation. No evidence of mitral stenosis. The left atrium is dilated. Thickened aortic valve leaflets. There is no significant aortic valve regurgitation or stenosis. The aortic root is mildly dilated measuring 3.8 cm. The right ventricle appears dilated with decreased systolic function. Tapse: 2.09 cm. RV s: 12.2 cm/s   The tricuspid valve is normal in structure. Mild tricuspid regurgitation. No evidence of tricuspid stenosis. IVC size is normal (<2.1cm) and collapses > 50% with respiration consistent   with normal RA pressure (3mmHg). Estimated pulmonary artery systolic pressure is at 45 mmHg assuming a right   atrial pressure of 3 mmHg. Definity contrast agent was used to help visualize endocardial borders. Last CMR  (if available):    Last Coronary Artery Calcium Score: Ankle-brachial index:    Carotid ultrasound screening:    Abdominal aortic aneurysm screening:    CT Chest: 2/11/23  Impression   1. Small foci of groundglass opacity with septal thickening in the left upper lobe and in the middle lobe. Lower lung branching opacities with septal thickening. Findings suggest atypical pneumonia. Clinical correlation recommended. 2. Small pericardial effusion. 3. Nonobstructive left nephrolithiasis. Assessment / Plan:     1. Chronic systolic heart failure ([address]    2. Hypertension, unspecified type    3. H/O non-ST elevation myocardial infarction (NSTEMI)    4. Left ventricular apical thrombus    5. NSTEMI (non-ST elevation myocardial infarction) (HCC)       No further chest discomfort. Occluded distal diagonal on angiography presumed secondary to embolus from LV thrombus. Continue anticoagulation with Eliquis 5 mg p.o. twice daily.   No bleeding problem  Blood pressure remains on the low side. Continue lisinopril 2.5 mg p.o. daily and Toprol-XL 25 mg daily. LifeVest in place. Tobacco cessation. Follow up in 1 month with BP log    No orders of the defined types were placed in this encounter. Return in about 1 month (around 3/21/2023). The note was completed using EMR and Dragon dictation system. Every effort was made to ensure accuracy; however, inadvertent computerized transcription errors may be present. All questions and concerns were addressed to the patient. I would like to thank you for providing me the opportunity to participate in the care of your patient. If you have any questions, please do not hesitate to contact me. Brandon Lund MD, Luisa Falling  The [address]  [address] [place] [address]  Main Office Phone: [phone]  Fax: [phone]    I, Colni Capone RN, am scribing for and in the presence of Dr. Brandon Lund. 02/22/23 9:11 AM  Colin Capone RN    Physician Attestation:  The scribes documentation has been prepared under my direction and personally reviewed by me in its entirety. I confirm the note above accurately reflects all work, treatment, procedures, and medical decision making performed by me.     Electronically signed by Brandon Lund MD on 2/22/2023 at 9:18 AM

## 2023-03-28 ENCOUNTER — TELEPHONE (OUTPATIENT)
Dept: PULMONOLOGY | Age: 43
End: 2023-03-28

## 2023-03-28 ENCOUNTER — OFFICE VISIT (OUTPATIENT)
Dept: CARDIOLOGY CLINIC | Age: 43
End: 2023-03-28
Payer: COMMERCIAL

## 2023-03-28 VITALS
WEIGHT: 198.4 LBS | HEART RATE: 102 BPM | HEIGHT: 71 IN | OXYGEN SATURATION: 92 % | BODY MASS INDEX: 27.77 KG/M2 | SYSTOLIC BLOOD PRESSURE: 116 MMHG | DIASTOLIC BLOOD PRESSURE: 66 MMHG

## 2023-03-28 DIAGNOSIS — I10 HYPERTENSION, UNSPECIFIED TYPE: ICD-10-CM

## 2023-03-28 DIAGNOSIS — R09.02 HYPOXIA: ICD-10-CM

## 2023-03-28 DIAGNOSIS — I25.2 H/O NON-ST ELEVATION MYOCARDIAL INFARCTION (NSTEMI): ICD-10-CM

## 2023-03-28 DIAGNOSIS — I50.22 CHRONIC SYSTOLIC HEART FAILURE (HCC): Primary | ICD-10-CM

## 2023-03-28 DIAGNOSIS — I51.3 LEFT VENTRICULAR APICAL THROMBUS: ICD-10-CM

## 2023-03-28 DIAGNOSIS — R06.02 SOB (SHORTNESS OF BREATH): Primary | ICD-10-CM

## 2023-03-28 DIAGNOSIS — N18.9 CHRONIC KIDNEY DISEASE, UNSPECIFIED CKD STAGE: ICD-10-CM

## 2023-03-28 DIAGNOSIS — E78.2 MIXED HYPERLIPIDEMIA: ICD-10-CM

## 2023-03-28 PROCEDURE — 99214 OFFICE O/P EST MOD 30 MIN: CPT | Performed by: INTERNAL MEDICINE

## 2023-03-28 PROCEDURE — 3074F SYST BP LT 130 MM HG: CPT | Performed by: INTERNAL MEDICINE

## 2023-03-28 PROCEDURE — 3078F DIAST BP <80 MM HG: CPT | Performed by: INTERNAL MEDICINE

## 2023-03-28 NOTE — PROGRESS NOTES
[address] [facility]     Outpatient Cardiology         Patient Name:  Ruby Wooten  Requesting Physician: No admitting provider for patient encounter. Primary Care Physician: No primary care provider on file. Reason for Consultation/Chief Complaint:   No chief complaint on file. HPI:     40year old male with history of CHF with LVEF <20%, cardiomyopathy, HTN, CKD, presents for follow up. Has been compliant with Lifevest.    Patient admitted to [facility] 2/8/23-2/15/23 with c/o SOB and chest pain. Found to have NSTEMI and coronary embolus. Underwent LHC, no intervention. LVEF <20% discharged with Lifevest.      LHC, 2/10/23, Normal coronaries. Tobacco use. Histories:     Past Medical History:   has a past medical history of Chronic kidney disease (CKD), Hemodialysis patient ([address] and Hypertension. Surgical History:   has no past surgical history on file. Social History:   reports that he has been smoking cigarettes. He has a 22.00 pack-year smoking history. He has been exposed to tobacco smoke. He has never used smokeless tobacco. He reports that he does not drink alcohol and does not use drugs. Family History:  No evidence for sudden cardiac death or premature CAD    Medications:     Home Medications:  Were reviewed and are listed in nursing record. and/or listed below    Prior to Admission medications    Medication Sig Start Date End Date Taking?  Authorizing Provider   aspirin 81 MG chewable tablet Take 1 tablet by mouth daily 2/16/23   Amy Velasquez MD   [facility]) 5 MG TABS tablet Take 1 tablet by mouth 2 times daily 2/17/23   Amy Velasquez MD   rosuvastatin (CRESTOR) 40 MG tablet Take 1 tablet by mouth nightly 2/15/23   Amy Velasquez MD   lisinopril (PRINIVIL;ZESTRIL) 2.5 MG tablet Take 1 tablet by mouth daily 2/16/23   Amy Velasquez MD   metoprolol succinate (TOPROL XL) 25 MG extended release tablet Take 1 tablet by mouth daily
week     Orders Placed This Encounter   Procedures    CT CHEST HIGH RESOLUTION    Comprehensive Metabolic Panel    Brain Natriuretic Peptide    Judy Coe MD, Pulmonary, [place]       Return in about 1 week (around 4/4/2023).      The note was completed using EMR and Dragon dictation system. Every effort was made to ensure accuracy; however, inadvertent computerized transcription errors may be present.    All questions and concerns were addressed to the patient.     I would like to thank you for providing me the opportunity to participate in the care of your patient. If you have any questions, please do not hesitate to contact me.     Fátima Blancas MD, [place] [address]  Main Office Phone: [phone]  Fax: [phone]    I, Romy Poon RN, am scribing for and in the presence of Dr. Fátima Blancas. 03/29/23 1:11 PM  Romy Poon RN    Physician Attestation:  The scribes documentation has been prepared under my direction and personally reviewed by me in its entirety.     I confirm the note above accurately reflects all work, treatment, procedures, and medical decision making performed by me.    Electronically signed by Fátima Blancas MD on 3/29/2023 at 1:12 PM

## 2023-04-04 ENCOUNTER — TELEPHONE (OUTPATIENT)
Dept: CARDIOLOGY CLINIC | Age: 43
End: 2023-04-04

## 2023-04-04 ENCOUNTER — HOSPITAL ENCOUNTER (OUTPATIENT)
Dept: CT IMAGING | Age: 43
Discharge: HOME | End: 2023-04-04
Payer: COMMERCIAL

## 2023-04-04 DIAGNOSIS — I50.22 CHRONIC SYSTOLIC HEART FAILURE (HCC): ICD-10-CM

## 2023-04-04 DIAGNOSIS — R09.02 HYPOXIA: ICD-10-CM

## 2023-04-04 PROCEDURE — 71250 CT THORAX DX C-: CPT

## 2023-04-04 NOTE — TELEPHONE ENCOUNTER
Laurita Mcmahan with [facility] [employer] imaging called to advise the patient is there for his ct chest high resolution but has a life vest on and Laurita Mcmahan thinks it may cause artifact. Per Dr Quincy Good I advised Laurita Mcmahan the patient can take the life vest off for the scan and then put it back on.

## 2023-04-05 LAB
ALBUMIN SERPL-MCNC: 4.5 G/DL (ref 3.4–5)
ALBUMIN/GLOB SERPL: 1.5 {RATIO} (ref 1.1–2.2)
ALP SERPL-CCNC: 81 U/L (ref 40–129)
ALT SERPL-CCNC: 25 U/L (ref 10–40)
ANION GAP SERPL CALCULATED.3IONS-SCNC: 15 MMOL/L (ref 3–16)
AST SERPL-CCNC: 23 U/L (ref 15–37)
BILIRUB SERPL-MCNC: 0.4 MG/DL (ref 0–1)
BUN SERPL-MCNC: 11 MG/DL (ref 7–20)
CALCIUM SERPL-MCNC: 9.5 MG/DL (ref 8.3–10.6)
CHLORIDE SERPL-SCNC: 99 MMOL/L (ref 99–110)
CO2 SERPL-SCNC: 28 MMOL/L (ref 21–32)
CREAT SERPL-MCNC: 0.8 MG/DL (ref 0.9–1.3)
GFR SERPLBLD CREATININE-BSD FMLA CKD-EPI: >60 ML/MIN/{1.73_M2}
GLUCOSE SERPL-MCNC: 93 MG/DL (ref 70–99)
NT-PROBNP SERPL-MCNC: 1237 PG/ML (ref 0–124)
POTASSIUM SERPL-SCNC: 3.9 MMOL/L (ref 3.5–5.1)
PROT SERPL-MCNC: 7.5 G/DL (ref 6.4–8.2)
SODIUM SERPL-SCNC: 142 MMOL/L (ref 136–145)

## 2023-04-27 ENCOUNTER — OFFICE VISIT (OUTPATIENT)
Dept: PULMONOLOGY | Age: 43
End: 2023-04-27
Payer: COMMERCIAL

## 2023-04-27 VITALS
OXYGEN SATURATION: 93 % | WEIGHT: 205 LBS | DIASTOLIC BLOOD PRESSURE: 88 MMHG | HEART RATE: 60 BPM | HEIGHT: 71 IN | BODY MASS INDEX: 28.7 KG/M2 | SYSTOLIC BLOOD PRESSURE: 128 MMHG

## 2023-04-27 DIAGNOSIS — J21.9 BRONCHIOLITIS: Primary | ICD-10-CM

## 2023-04-27 PROCEDURE — 99204 OFFICE O/P NEW MOD 45 MIN: CPT | Performed by: INTERNAL MEDICINE

## 2023-04-27 RX ORDER — PREDNISONE 20 MG/1
20 TABLET ORAL DAILY
Qty: 30 TABLET | Refills: 1 | Status: SHIPPED | OUTPATIENT
Start: 2023-04-27

## 2023-04-27 ASSESSMENT — ENCOUNTER SYMPTOMS
WHEEZING: 0
SHORTNESS OF BREATH: 1
COUGH: 1
CHEST TIGHTNESS: 0

## 2023-04-27 NOTE — PROGRESS NOTES
left anterior descending artery and the left circumflex artery. The left main reveals normal coronaries. Distal D3 appears occluded. 7. The left anterior descending artery arises in normal fashion from left coronary giving rise to diagonals and septal branches. The LAD reveals normal coronaries. 8. The left circumflex/OM system reveals normal coronaries. 9. The right coronary artery is a right dominant vessel with no significant stenosis. CONCLUSIONS:  1. Normal coronaries  2. Probably occluded distal D3 due to embolization  3. Low right and left sided pressure/normal co/ci      RECOMMENDATIONS:    1. Routine post cardiac catheterization care  2. Aggressive risk factor modification    Echo : 2/9/23  Conclusions    Summary   Severe Left ventricular enlargement. Severely decreased left ventricular systolic function with an estimated   ejection fraction of <20%. Global hypokinesis. Abnormal diastolic filling pattern. There appear to be multiple thrombi within the left ventricular apex. The mitral valve is normal in structure. Mild mitral regurgitation. No evidence of mitral stenosis. The left atrium is dilated. Thickened aortic valve leaflets. There is no significant aortic valve regurgitation or stenosis. The aortic root is mildly dilated measuring 3.8 cm. The right ventricle appears dilated with decreased systolic function. Tapse: 2.09 cm. RV s: 12.2 cm/s   The tricuspid valve is normal in structure. Mild tricuspid regurgitation. No evidence of tricuspid stenosis. IVC size is normal (<2.1cm) and collapses > 50% with respiration consistent   with normal RA pressure (3mmHg). Estimated pulmonary artery systolic pressure is at 45 mmHg assuming a right   atrial pressure of 3 mmHg. Definity contrast agent was used to help visualize endocardial borders. Assessment:      Diagnosis Orders   1.  Bronchiolitis            Plan:   40year old male, recently diagnosed with NICM with

## 2023-05-09 ENCOUNTER — OFFICE VISIT (OUTPATIENT)
Dept: CARDIOLOGY CLINIC | Age: 43
End: 2023-05-09
Payer: MEDICAID

## 2023-05-09 VITALS
HEART RATE: 78 BPM | BODY MASS INDEX: 29.01 KG/M2 | DIASTOLIC BLOOD PRESSURE: 72 MMHG | SYSTOLIC BLOOD PRESSURE: 128 MMHG | WEIGHT: 208 LBS

## 2023-05-09 DIAGNOSIS — E78.2 MIXED HYPERLIPIDEMIA: ICD-10-CM

## 2023-05-09 DIAGNOSIS — I10 HYPERTENSION, UNSPECIFIED TYPE: ICD-10-CM

## 2023-05-09 DIAGNOSIS — I50.22 CHRONIC SYSTOLIC HEART FAILURE (HCC): Primary | ICD-10-CM

## 2023-05-09 DIAGNOSIS — J21.9 BRONCHIOLITIS: ICD-10-CM

## 2023-05-09 PROCEDURE — 99214 OFFICE O/P EST MOD 30 MIN: CPT | Performed by: INTERNAL MEDICINE

## 2023-05-09 PROCEDURE — 3078F DIAST BP <80 MM HG: CPT | Performed by: INTERNAL MEDICINE

## 2023-05-09 PROCEDURE — 3074F SYST BP LT 130 MM HG: CPT | Performed by: INTERNAL MEDICINE

## 2023-05-09 RX ORDER — VARENICLINE TARTRATE 0.5 MG/1
.5-1 TABLET, FILM COATED ORAL SEE ADMIN INSTRUCTIONS
Qty: 57 TABLET | Refills: 0 | Status: SHIPPED | OUTPATIENT
Start: 2023-05-09

## 2023-05-09 RX ORDER — LISINOPRIL 5 MG/1
5 TABLET ORAL DAILY
Qty: 90 TABLET | Refills: 3 | Status: SHIPPED | OUTPATIENT
Start: 2023-05-09 | End: 2023-05-09 | Stop reason: ALTCHOICE

## 2023-05-09 NOTE — PATIENT INSTRUCTIONS
Tolerating betablocker BP at goal   Continue Lasix to 40 mg twice a day; labs today  Start Chantix taper to aid in smoking cessation  Stop lisinopril   Start Entresto 24-26 mg twice daily. Do not start this until 5/10 evening  Monitor BP and log this.  Call later this week with 2-3 days of reading   CMP lipids, CBC, tsh in 1 week  Repeat Echocardiogram same day as follow up apt in 2-4 weeks

## 2023-05-25 ENCOUNTER — OFFICE VISIT (OUTPATIENT)
Dept: PULMONOLOGY | Age: 43
End: 2023-05-25
Payer: MEDICAID

## 2023-05-25 VITALS
WEIGHT: 208 LBS | DIASTOLIC BLOOD PRESSURE: 76 MMHG | OXYGEN SATURATION: 93 % | SYSTOLIC BLOOD PRESSURE: 110 MMHG | HEIGHT: 71 IN | HEART RATE: 80 BPM | BODY MASS INDEX: 29.12 KG/M2

## 2023-05-25 DIAGNOSIS — J21.9 BRONCHIOLITIS: Primary | ICD-10-CM

## 2023-05-25 PROCEDURE — 99214 OFFICE O/P EST MOD 30 MIN: CPT | Performed by: INTERNAL MEDICINE

## 2023-05-25 RX ORDER — PREDNISONE 1 MG/1
TABLET ORAL
Qty: 46 TABLET | Refills: 5 | Status: SHIPPED | OUTPATIENT
Start: 2023-05-25

## 2023-05-25 RX ORDER — ALBUTEROL SULFATE 90 UG/1
AEROSOL, METERED RESPIRATORY (INHALATION)
COMMUNITY
Start: 2023-05-11

## 2023-05-25 ASSESSMENT — ENCOUNTER SYMPTOMS
CHEST TIGHTNESS: 0
WHEEZING: 0
COUGH: 0
SHORTNESS OF BREATH: 1

## 2023-05-25 NOTE — PROGRESS NOTES
[facility]     Outpatient Cardiology         Patient Name:  Amanda Alberto  Requesting Physician: No admitting provider for patient encounter. Primary Care Physician: No primary care provider on file. Reason for Consultation/Chief Complaint:   Chief Complaint   Patient presents with    Follow-up     HPI:     Amanda Alberto is a 40 y.o. male with a history of CHF with LVEF <20%, cardiomyopathy, HTN, CKD, presents for follow up. Patient admitted to [facility] 2/8/23-2/15/23 with c/o SOB and chest pain. Found to have NSTEMI and coronary embolus. Underwent LHC, no intervention. LVEF <20% discharged with Lifevest. At his follow up on 5/9/23, he was started on Entresto 24-26. Today he reports he has been feeling fairly well. He is still taking the Prednisone but does not feel a big difference in his breathing. He continues to wear his life vest. His echo from today is unchanged with an EF of 20%. Denies worsening sob. Patient currently denies any weight gain, edema, palpitations, chest pain dizziness, and syncope. LHC, 2/10/23, Normal coronaries. CT Chest high resolution 4/4/2023:   COMMENTS:    Nonobstructing 3 mm right renal calculus noted. Upper abdomen is otherwise unremarkable. Mild coronary artery calcification. Noncontrast evaluation of the mediastinal structures is otherwise unremarkable. 4 mm perifissural nodule arising from the right major fissure image [id number] is stable. Mild bibasilar linear atelectasis/scarring. Mild nonspecific subpleural scarring without change. IMPRESSION:   No acute abnormality seen. Small nonobstructing right renal calculus. Tobacco use. On 2L home oxygen since hospital admission in [place] [address] in March 2023. Newly diagnosed with COPD. Patient not waking up short of breath and able to lay flat.      Histories:     Past Medical History:   has a past medical history of Chronic kidney disease (CKD), Hemodialysis patient ([facility] [address] and

## 2023-05-25 NOTE — PROGRESS NOTES
[facility] Pulmonary and Critical Care    Outpatient Note    Subjective:   CHIEF COMPLAINT:   Chief Complaint   Patient presents with    Follow-up     Interval history on 5/25/23  Breathing is better. He is not running out of breath is much. He still has bendopnea but walking on level ground is better. He is following closely with cardiology. No leg edema. He is on 3 liters O2. O2 sat was 93%  He is currently on Chantix. Last time smoked was 3 weeks ago. He is wearing a life vest    HPI:  4/27/23   The patient is 40 y.o. male who presents today for a new patient visit for hypoxia. He was diagnosed with CHF in Feb.    He is on O2 since since March. He has cough mainly in am.  It is productive with yellow sputum. This is chronic for him. There is no fever or chills. He can walk on level ground but get out of breath with going up hill. There is no chest pain. He is a smoker. Currently smoked 5-6 cigarettes a day. He used to smoke 1 PPD for 24 years. He is a . He has significant exposure to dust over the years    Past Medical History:    Past Medical History:   Diagnosis Date    Chronic kidney disease (CKD) 02/01/2018    Estimated by patient    Hemodialysis patient ([address]     Reports 190 days of 3x week    Hypertension        Social History:    Social History     Tobacco Use   Smoking Status Every Day    Packs/day: 1.00    Years: 22.00    Pack years: 22.00    Types: Cigarettes    Passive exposure: Current   Smokeless Tobacco Never       Family History:  No family history on file.     Current Medications:  Current Outpatient Medications on File Prior to Visit   Medication Sig Dispense Refill    varenicline (CHANTIX) 0.5 MG tablet Take 1-2 tablets by mouth See Admin Instructions 0.5mg DAILY for 3 days followed by 0.5mg TWICE DAILY for 4 days followed by 1mg TWICE DAILY 57 tablet 0    sacubitril-valsartan (ENTRESTO) 24-26 MG per tablet Take 1 tablet by mouth 2

## 2023-05-26 ENCOUNTER — OFFICE VISIT (OUTPATIENT)
Dept: CARDIOLOGY CLINIC | Age: 43
End: 2023-05-26
Payer: MEDICAID

## 2023-05-26 ENCOUNTER — PROCEDURE VISIT (OUTPATIENT)
Dept: CARDIOLOGY CLINIC | Age: 43
End: 2023-05-26

## 2023-05-26 ENCOUNTER — TELEPHONE (OUTPATIENT)
Dept: CARDIOLOGY CLINIC | Age: 43
End: 2023-05-26

## 2023-05-26 VITALS
HEIGHT: 71 IN | HEART RATE: 101 BPM | WEIGHT: 213.7 LBS | OXYGEN SATURATION: 92 % | DIASTOLIC BLOOD PRESSURE: 76 MMHG | SYSTOLIC BLOOD PRESSURE: 142 MMHG | BODY MASS INDEX: 29.92 KG/M2

## 2023-05-26 DIAGNOSIS — I51.3 LEFT VENTRICULAR APICAL THROMBUS: ICD-10-CM

## 2023-05-26 DIAGNOSIS — E78.2 MIXED HYPERLIPIDEMIA: ICD-10-CM

## 2023-05-26 DIAGNOSIS — I42.8 NICM (NONISCHEMIC CARDIOMYOPATHY) (HCC): ICD-10-CM

## 2023-05-26 DIAGNOSIS — I50.22 CHRONIC SYSTOLIC HEART FAILURE (HCC): Primary | ICD-10-CM

## 2023-05-26 DIAGNOSIS — I50.22 CHRONIC SYSTOLIC HEART FAILURE (HCC): ICD-10-CM

## 2023-05-26 LAB
BASOPHILS # BLD: 0 K/UL (ref 0–0.2)
BASOPHILS NFR BLD: 0.5 %
CHOLEST SERPL-MCNC: 188 MG/DL (ref 0–199)
DEPRECATED RDW RBC AUTO: 15.9 % (ref 12.4–15.4)
EOSINOPHIL # BLD: 0 K/UL (ref 0–0.6)
EOSINOPHIL NFR BLD: 0.3 %
HCT VFR BLD AUTO: 50.4 % (ref 40.5–52.5)
HDLC SERPL-MCNC: 74 MG/DL (ref 40–60)
HGB BLD-MCNC: 16.5 G/DL (ref 13.5–17.5)
LDLC SERPL CALC-MCNC: 102 MG/DL
LV EF: 23 %
LVEF MODALITY: NORMAL
LYMPHOCYTES # BLD: 1.2 K/UL (ref 1–5.1)
LYMPHOCYTES NFR BLD: 13.9 %
MCH RBC QN AUTO: 30.6 PG (ref 26–34)
MCHC RBC AUTO-ENTMCNC: 32.8 G/DL (ref 31–36)
MCV RBC AUTO: 93.5 FL (ref 80–100)
MONOCYTES # BLD: 0.2 K/UL (ref 0–1.3)
MONOCYTES NFR BLD: 2.1 %
NEUTROPHILS # BLD: 7.5 K/UL (ref 1.7–7.7)
NEUTROPHILS NFR BLD: 83.2 %
PLATELET # BLD AUTO: 205 K/UL (ref 135–450)
PMV BLD AUTO: 9.2 FL (ref 5–10.5)
RBC # BLD AUTO: 5.39 M/UL (ref 4.2–5.9)
TRIGL SERPL-MCNC: 61 MG/DL (ref 0–150)
TSH SERPL DL<=0.005 MIU/L-ACNC: 1.07 UIU/ML (ref 0.27–4.2)
VLDLC SERPL CALC-MCNC: 12 MG/DL
WBC # BLD AUTO: 9 K/UL (ref 4–11)

## 2023-05-26 PROCEDURE — 99214 OFFICE O/P EST MOD 30 MIN: CPT | Performed by: INTERNAL MEDICINE

## 2023-05-26 NOTE — PATIENT INSTRUCTIONS
Tolerating betablocker BP at goal   Continue Lasix to 40 mg twice a day; labs today  Continue Entresto 24-26 mg twice daily. Monitor BP and log this.   STAT Cardiac MRI  Will start [address] after getting labs  Apt today 3pm with NP    [phone]

## 2023-05-30 DIAGNOSIS — I51.3 LEFT VENTRICULAR APICAL THROMBUS: ICD-10-CM

## 2023-05-30 DIAGNOSIS — I50.22 CHRONIC SYSTOLIC HEART FAILURE (HCC): ICD-10-CM

## 2023-05-30 NOTE — PROGRESS NOTES
note was completed using EMR. Every effort was made to ensure accuracy; however, inadvertent computerized transcription errors may be present. Patient received education regarding their diagnosis, treatment and medications while in the office today. Joaquim Murillo CNP  [facility]       I  have spent 61 minutes in care of the patient including direct face to face time, chart preparation, reviewing diagnostic testing, other provider notes and coordinating patient care.

## 2023-05-30 NOTE — TELEPHONE ENCOUNTER
Called central scheduling and she said they it was documented two calls were made Friday but no return on approval a time. I spoke to [facility] who will reach out to her coworkers and see who was working on it.

## 2023-06-01 ENCOUNTER — OFFICE VISIT (OUTPATIENT)
Dept: CARDIOLOGY CLINIC | Age: 43
End: 2023-06-01
Payer: MEDICAID

## 2023-06-01 ENCOUNTER — HOSPITAL ENCOUNTER (OUTPATIENT)
Dept: MRI IMAGING | Age: 43
Discharge: HOME | End: 2023-06-01
Payer: MEDICAID

## 2023-06-01 VITALS
HEART RATE: 95 BPM | DIASTOLIC BLOOD PRESSURE: 84 MMHG | SYSTOLIC BLOOD PRESSURE: 112 MMHG | BODY MASS INDEX: 30.27 KG/M2 | WEIGHT: 217 LBS

## 2023-06-01 DIAGNOSIS — I50.22 CHRONIC SYSTOLIC HEART FAILURE (HCC): ICD-10-CM

## 2023-06-01 DIAGNOSIS — I24.0 CORONARY ARTERY THROMBOSIS (HCC): ICD-10-CM

## 2023-06-01 DIAGNOSIS — Z79.01 ON CONTINUOUS ORAL ANTICOAGULATION: ICD-10-CM

## 2023-06-01 DIAGNOSIS — I50.22 CHRONIC SYSTOLIC CHF (CONGESTIVE HEART FAILURE) (HCC): ICD-10-CM

## 2023-06-01 DIAGNOSIS — I10 BENIGN ESSENTIAL HTN: ICD-10-CM

## 2023-06-01 DIAGNOSIS — I51.3 LEFT VENTRICULAR APICAL THROMBUS: ICD-10-CM

## 2023-06-01 DIAGNOSIS — I42.8 NICM (NONISCHEMIC CARDIOMYOPATHY) (HCC): Primary | ICD-10-CM

## 2023-06-01 PROCEDURE — A9585 GADOBUTROL INJECTION: HCPCS | Performed by: INTERNAL MEDICINE

## 2023-06-01 PROCEDURE — 6360000004 HC RX CONTRAST MEDICATION: Performed by: INTERNAL MEDICINE

## 2023-06-01 PROCEDURE — 3079F DIAST BP 80-89 MM HG: CPT | Performed by: NURSE PRACTITIONER

## 2023-06-01 PROCEDURE — 75565 CARD MRI VELOC FLOW MAPPING: CPT

## 2023-06-01 PROCEDURE — 3074F SYST BP LT 130 MM HG: CPT | Performed by: NURSE PRACTITIONER

## 2023-06-01 PROCEDURE — 93000 ELECTROCARDIOGRAM COMPLETE: CPT | Performed by: NURSE PRACTITIONER

## 2023-06-01 PROCEDURE — 99205 OFFICE O/P NEW HI 60 MIN: CPT | Performed by: NURSE PRACTITIONER

## 2023-06-01 RX ORDER — METOPROLOL SUCCINATE 25 MG/1
25 TABLET, EXTENDED RELEASE ORAL 2 TIMES DAILY
Qty: 60 TABLET | Refills: 5 | Status: SHIPPED | OUTPATIENT
Start: 2023-06-01

## 2023-06-01 RX ADMIN — GADOBUTROL 15 ML: 604.72 INJECTION INTRAVENOUS at 14:06

## 2023-06-01 NOTE — PATIENT INSTRUCTIONS
Increase Toprol XL to 25 mg twice a day. BMP (lab)  If the creatinine is OK, will have Jacquelyn send in a prescription for [place]. Arrive next Friday at 6:30 at [facility] for ICD implant.

## 2023-06-02 ENCOUNTER — TELEPHONE (OUTPATIENT)
Dept: CARDIOLOGY CLINIC | Age: 43
End: 2023-06-02

## 2023-06-02 DIAGNOSIS — I21.4 NSTEMI (NON-ST ELEVATED MYOCARDIAL INFARCTION) (HCC): ICD-10-CM

## 2023-06-02 DIAGNOSIS — I42.8 NICM (NONISCHEMIC CARDIOMYOPATHY) (HCC): ICD-10-CM

## 2023-06-02 LAB
LV EF: 31 %
LVEF MODALITY: NORMAL

## 2023-06-02 RX ORDER — FUROSEMIDE 40 MG/1
40 TABLET ORAL DAILY
Qty: 60 TABLET | Refills: 3 | Status: SHIPPED | OUTPATIENT
Start: 2023-06-02

## 2023-06-02 NOTE — TELEPHONE ENCOUNTER
Patient returned a missed call from SONNY Maya. He said there wasn't a message stating what was needed.  Call back

## 2023-06-07 ENCOUNTER — PREP FOR PROCEDURE (OUTPATIENT)
Dept: CARDIOLOGY CLINIC | Age: 43
End: 2023-06-07

## 2023-06-07 RX ORDER — SODIUM CHLORIDE 9 MG/ML
INJECTION, SOLUTION INTRAVENOUS PRN
Status: CANCELLED | OUTPATIENT
Start: 2023-06-09

## 2023-06-07 RX ORDER — CEFAZOLIN SODIUM 1 G/3ML
2000 INJECTION, POWDER, FOR SOLUTION INTRAMUSCULAR; INTRAVENOUS ONCE
Status: CANCELLED | OUTPATIENT
Start: 2023-06-09 | End: 2023-06-07

## 2023-06-07 RX ORDER — SODIUM CHLORIDE 0.9 % (FLUSH) 0.9 %
5-40 SYRINGE (ML) INJECTION EVERY 12 HOURS SCHEDULED
Status: CANCELLED | OUTPATIENT
Start: 2023-06-09

## 2023-06-07 RX ORDER — SODIUM CHLORIDE 0.9 % (FLUSH) 0.9 %
5-40 SYRINGE (ML) INJECTION PRN
Status: CANCELLED | OUTPATIENT
Start: 2023-06-09

## 2023-06-08 ENCOUNTER — TELEPHONE (OUTPATIENT)
Dept: CARDIOLOGY CLINIC | Age: 43
End: 2023-06-08

## 2023-06-08 RX ORDER — ROSUVASTATIN CALCIUM 40 MG/1
40 TABLET, COATED ORAL NIGHTLY
Qty: 90 TABLET | Refills: 3 | Status: SHIPPED | OUTPATIENT
Start: 2023-06-08

## 2023-06-08 RX ORDER — ASPIRIN 81 MG/1
81 TABLET, CHEWABLE ORAL DAILY
Qty: 90 TABLET | Refills: 3 | Status: SHIPPED | OUTPATIENT
Start: 2023-06-08

## 2023-06-08 NOTE — TELEPHONE ENCOUNTER
Patient needs a refill for the following medications:    aspirin 81 MG chewable tablet [[id number]]     Order Details  Dose: 81 mg Route: Oral Frequency: DAILY   Dispense Quantity: 30 tablet Refills: 3          Sig: Take 1 tablet by mouth daily     apixaban (ELIQUIS) 5 MG TABS tablet [[id number]]     Order Details  Dose: 5 mg Route: Oral Frequency: 2 TIMES DAILY   Dispense Quantity: 60 tablet Refills: 3          Sig: Take 1 tablet by mouth 2 times daily     rosuvastatin (CRESTOR) 40 MG tablet [[id number]]     Order Details  Dose: 40 mg Route: Oral Frequency: NIGHTLY   Dispense Quantity: 30 tablet Refills: 3          Sig: Take 1 tablet by mouth nightly             [address] [facility] [address]  [place] [phone] - F [phone]   [address] [place] [address]   Phone:  [phone]  Fax:  [phone]    Lov 6/1/23  Nov

## 2023-06-09 ENCOUNTER — ANESTHESIA EVENT (OUTPATIENT)
Dept: CARDIAC CATH/INVASIVE PROCEDURES | Age: 43
End: 2023-06-09

## 2023-06-09 ENCOUNTER — ANESTHESIA (OUTPATIENT)
Dept: CARDIAC CATH/INVASIVE PROCEDURES | Age: 43
End: 2023-06-09

## 2023-06-09 ENCOUNTER — HOSPITAL ENCOUNTER (OUTPATIENT)
Dept: CARDIAC CATH/INVASIVE PROCEDURES | Age: 43
End: 2023-06-09
Payer: MEDICAID

## 2023-06-09 ENCOUNTER — HOSPITAL ENCOUNTER (OUTPATIENT)
Dept: GENERAL RADIOLOGY | Age: 43
Discharge: HOME | End: 2023-06-09
Payer: MEDICAID

## 2023-06-09 VITALS — WEIGHT: 213.4 LBS | HEIGHT: 71 IN | TEMPERATURE: 98 F | BODY MASS INDEX: 29.88 KG/M2

## 2023-06-09 LAB
BASOPHILS # BLD: 0 K/UL (ref 0–0.2)
BASOPHILS NFR BLD: 0.3 %
DEPRECATED RDW RBC AUTO: 15 % (ref 12.4–15.4)
EKG ATRIAL RATE: 79 BPM
EKG DIAGNOSIS: NORMAL
EKG P AXIS: 59 DEGREES
EKG P-R INTERVAL: 154 MS
EKG Q-T INTERVAL: 392 MS
EKG QRS DURATION: 102 MS
EKG QTC CALCULATION (BAZETT): 449 MS
EKG R AXIS: 48 DEGREES
EKG T AXIS: 233 DEGREES
EKG VENTRICULAR RATE: 79 BPM
EOSINOPHIL # BLD: 0.1 K/UL (ref 0–0.6)
EOSINOPHIL NFR BLD: 0.6 %
HCT VFR BLD AUTO: 47.2 % (ref 40.5–52.5)
HGB BLD-MCNC: 15.8 G/DL (ref 13.5–17.5)
LYMPHOCYTES # BLD: 2.4 K/UL (ref 1–5.1)
LYMPHOCYTES NFR BLD: 24.5 %
MCH RBC QN AUTO: 30.5 PG (ref 26–34)
MCHC RBC AUTO-ENTMCNC: 33.5 G/DL (ref 31–36)
MCV RBC AUTO: 91.1 FL (ref 80–100)
MONOCYTES # BLD: 0.5 K/UL (ref 0–1.3)
MONOCYTES NFR BLD: 5.3 %
NEUTROPHILS # BLD: 6.9 K/UL (ref 1.7–7.7)
NEUTROPHILS NFR BLD: 69.3 %
PLATELET # BLD AUTO: 187 K/UL (ref 135–450)
PMV BLD AUTO: 8.4 FL (ref 5–10.5)
RBC # BLD AUTO: 5.19 M/UL (ref 4.2–5.9)
WBC # BLD AUTO: 9.9 K/UL (ref 4–11)

## 2023-06-09 PROCEDURE — C1892 INTRO/SHEATH,FIXED,PEEL-AWAY: HCPCS

## 2023-06-09 PROCEDURE — 3700000000 HC ANESTHESIA ATTENDED CARE

## 2023-06-09 PROCEDURE — 33249 INSJ/RPLCMT DEFIB W/LEAD(S): CPT

## 2023-06-09 PROCEDURE — 6360000002 HC RX W HCPCS: Performed by: NURSE ANESTHETIST, CERTIFIED REGISTERED

## 2023-06-09 PROCEDURE — 2580000003 HC RX 258: Performed by: NURSE ANESTHETIST, CERTIFIED REGISTERED

## 2023-06-09 PROCEDURE — C1722 AICD, SINGLE CHAMBER: HCPCS

## 2023-06-09 PROCEDURE — 2580000003 HC RX 258

## 2023-06-09 PROCEDURE — 93010 ELECTROCARDIOGRAM REPORT: CPT | Performed by: INTERNAL MEDICINE

## 2023-06-09 PROCEDURE — 85025 COMPLETE CBC W/AUTO DIFF WBC: CPT

## 2023-06-09 PROCEDURE — 2500000003 HC RX 250 WO HCPCS

## 2023-06-09 PROCEDURE — 93005 ELECTROCARDIOGRAM TRACING: CPT | Performed by: INTERNAL MEDICINE

## 2023-06-09 PROCEDURE — 2709999900 HC NON-CHARGEABLE SUPPLY

## 2023-06-09 PROCEDURE — C1777 LEAD, AICD, ENDO SINGLE COIL: HCPCS

## 2023-06-09 PROCEDURE — 6360000002 HC RX W HCPCS

## 2023-06-09 PROCEDURE — 71045 X-RAY EXAM CHEST 1 VIEW: CPT

## 2023-06-09 PROCEDURE — 6360000002 HC RX W HCPCS: Performed by: INTERNAL MEDICINE

## 2023-06-09 PROCEDURE — 3700000001 HC ADD 15 MINUTES (ANESTHESIA)

## 2023-06-09 PROCEDURE — C1894 INTRO/SHEATH, NON-LASER: HCPCS

## 2023-06-09 RX ORDER — FENTANYL CITRATE 50 UG/ML
INJECTION, SOLUTION INTRAMUSCULAR; INTRAVENOUS PRN
Status: DISCONTINUED | OUTPATIENT
Start: 2023-06-09 | End: 2023-06-09 | Stop reason: SDUPTHER

## 2023-06-09 RX ORDER — SODIUM CHLORIDE, SODIUM LACTATE, POTASSIUM CHLORIDE, CALCIUM CHLORIDE 600; 310; 30; 20 MG/100ML; MG/100ML; MG/100ML; MG/100ML
INJECTION, SOLUTION INTRAVENOUS CONTINUOUS PRN
Status: DISCONTINUED | OUTPATIENT
Start: 2023-06-09 | End: 2023-06-09 | Stop reason: SDUPTHER

## 2023-06-09 RX ORDER — SODIUM CHLORIDE 9 MG/ML
INJECTION, SOLUTION INTRAVENOUS PRN
Status: ACTIVE | OUTPATIENT
Start: 2023-06-09

## 2023-06-09 RX ORDER — MIDAZOLAM HYDROCHLORIDE 1 MG/ML
INJECTION INTRAMUSCULAR; INTRAVENOUS PRN
Status: DISCONTINUED | OUTPATIENT
Start: 2023-06-09 | End: 2023-06-09 | Stop reason: SDUPTHER

## 2023-06-09 RX ORDER — SODIUM CHLORIDE 0.9 % (FLUSH) 0.9 %
5-40 SYRINGE (ML) INJECTION PRN
Status: ACTIVE | OUTPATIENT
Start: 2023-06-09

## 2023-06-09 RX ORDER — CEFAZOLIN SODIUM 1 G/3ML
2000 INJECTION, POWDER, FOR SOLUTION INTRAMUSCULAR; INTRAVENOUS ONCE
Status: COMPLETED | OUTPATIENT
Start: 2023-06-09 | End: 2023-06-09

## 2023-06-09 RX ORDER — LIDOCAINE HYDROCHLORIDE 20 MG/ML
INJECTION, SOLUTION INTRAVENOUS PRN
Status: DISCONTINUED | OUTPATIENT
Start: 2023-06-09 | End: 2023-06-09 | Stop reason: SDUPTHER

## 2023-06-09 RX ORDER — PROPOFOL 10 MG/ML
INJECTION, EMULSION INTRAVENOUS CONTINUOUS PRN
Status: DISCONTINUED | OUTPATIENT
Start: 2023-06-09 | End: 2023-06-09 | Stop reason: SDUPTHER

## 2023-06-09 RX ORDER — SODIUM CHLORIDE 0.9 % (FLUSH) 0.9 %
5-40 SYRINGE (ML) INJECTION EVERY 12 HOURS SCHEDULED
Status: ACTIVE | OUTPATIENT
Start: 2023-06-09

## 2023-06-09 RX ADMIN — PHENYLEPHRINE HYDROCHLORIDE 50 MCG: 10 INJECTION, SOLUTION INTRAMUSCULAR; INTRAVENOUS; SUBCUTANEOUS at 08:17

## 2023-06-09 RX ADMIN — SODIUM CHLORIDE, SODIUM LACTATE, POTASSIUM CHLORIDE, AND CALCIUM CHLORIDE: .6; .31; .03; .02 INJECTION, SOLUTION INTRAVENOUS at 07:00

## 2023-06-09 RX ADMIN — PHENYLEPHRINE HYDROCHLORIDE 50 MCG: 10 INJECTION, SOLUTION INTRAMUSCULAR; INTRAVENOUS; SUBCUTANEOUS at 08:14

## 2023-06-09 RX ADMIN — MIDAZOLAM HYDROCHLORIDE 2 MG: 2 INJECTION, SOLUTION INTRAMUSCULAR; INTRAVENOUS at 07:35

## 2023-06-09 RX ADMIN — PROPOFOL 100 MCG/KG/MIN: 10 INJECTION, EMULSION INTRAVENOUS at 07:37

## 2023-06-09 RX ADMIN — FENTANYL CITRATE 50 MCG: 50 INJECTION, SOLUTION INTRAMUSCULAR; INTRAVENOUS at 07:36

## 2023-06-09 RX ADMIN — FENTANYL CITRATE 50 MCG: 50 INJECTION, SOLUTION INTRAMUSCULAR; INTRAVENOUS at 07:37

## 2023-06-09 RX ADMIN — CEFAZOLIN SODIUM 2 G: 1 POWDER, FOR SOLUTION INTRAMUSCULAR; INTRAVENOUS at 07:43

## 2023-06-09 RX ADMIN — LIDOCAINE HYDROCHLORIDE 100 MG: 20 INJECTION, SOLUTION INTRAVENOUS at 07:37

## 2023-06-09 RX ADMIN — PHENYLEPHRINE HYDROCHLORIDE 50 MCG: 10 INJECTION, SOLUTION INTRAMUSCULAR; INTRAVENOUS; SUBCUTANEOUS at 08:21

## 2023-06-09 RX ADMIN — PHENYLEPHRINE HYDROCHLORIDE 100 MCG: 10 INJECTION, SOLUTION INTRAMUSCULAR; INTRAVENOUS; SUBCUTANEOUS at 08:26

## 2023-06-09 NOTE — ANESTHESIA POSTPROCEDURE EVALUATION
Department of Anesthesiology  Postprocedure Note    Patient: Elise Guardado  MRN: [id number]  YOB: 1980  Date of evaluation: 6/9/2023      Procedure Summary     Date: 06/09/23 Room / Location: [facility] Cath Lab    Anesthesia Start: [facility] Anesthesia Stop: [place]    Procedure: Tsosie Farris ICD W ANES Diagnosis:     Scheduled Providers: Bg Reed MD Responsible Provider: Bg Reed MD    Anesthesia Type: MAC ASA Status: 4          Anesthesia Type: No value filed.     Dawna Phase I:      Dawna Phase II:        Anesthesia Post Evaluation    Patient location during evaluation: PACU  Patient participation: complete - patient participated  Level of consciousness: awake and alert  Airway patency: patent  Nausea & Vomiting: no nausea and no vomiting  Complications: no  Cardiovascular status: hemodynamically stable  Respiratory status: acceptable  Hydration status: euvolemic  Multimodal analgesia pain management approach

## 2023-06-09 NOTE — DISCHARGE INSTRUCTIONS
for a wound and device check. [address], [address] Phone: [phone]. If you are unable to make this appointment, please call to reschedule.

## 2023-06-09 NOTE — ANESTHESIA PRE PROCEDURE
Department of Anesthesiology  Preprocedure Note       Name:  Paola Finney   Age:  40 y.o.  :  1980                                          MRN:  [id number]         Date:  2023      Surgeon: * No surgeons listed *    Procedure: * No procedures listed *    Medications prior to admission:   Prior to Admission medications    Medication Sig Start Date End Date Taking?  Authorizing Provider   Nicotine (NICODERM CQ TD) Place onto the skin   Yes Historical Provider, MD   apixaban (ELIQUIS) 5 MG TABS tablet Take 1 tablet by mouth 2 times daily 23   David Mathew MD   aspirin 81 MG chewable tablet Take 1 tablet by mouth daily 23   David Mathew MD   rosuvastatin (CRESTOR) 40 MG tablet Take 1 tablet by mouth nightly 23   David Mathew MD   furosemide (LASIX) 40 MG tablet Take 1 tablet by mouth daily 23   MANDIE Kumari CNP   dapagliflozin (FARXIGA) 10 MG tablet Take 1 tablet by mouth every morning 23   MANDIE Kumari CNP   metoprolol succinate (TOPROL XL) 25 MG extended release tablet Take 1 tablet by mouth in the morning and at bedtime 23   MANDIE Farley CNP   sacubitril-valsartan (ENTRESTO) 24-26 MG per tablet Take 1 tablet by mouth 2 times daily Samples [id number] exp 23   MANDIE Farley CNP   VENTOLIN  (90 Base) MCG/ACT inhaler INHALE 2 PUFFS EVERY 6 HOURS AS NEEDED 23   Historical Provider, MD   predniSONE (DELTASONE) 5 MG tablet Take 3 pills daily for 7 days then 2 pills daily for 7 days then 1 pill daily for 7 days then 1/2 pill daily for 7 days then stop 23   Judy Whaley MD   varenicline (CHANTIX) 0.5 MG tablet Take 1-2 tablets by mouth See Admin Instructions 0.5mg DAILY for 3 days followed by 0.5mg TWICE DAILY for 4 days followed by 1mg TWICE DAILY 23   David Mathew MD       Current medications:    Current Outpatient Medications   Medication Sig Dispense Refill    Nicotine (NICODERM CQ TD) Place onto the skin     

## 2023-06-09 NOTE — PROCEDURES
allowing us to participate in the care of your patient. If you have any questions, please do not hesitate to contact me.     Nay Genao MD   Cardiac Electrophysiology  [facility] [place] [phone]  [place]

## 2023-06-09 NOTE — INTERVAL H&P NOTE
Update History & Physical    The patient's History and Physical of June 1,  was reviewed with the patient and I examined the patient. There was no change. The surgical site was confirmed by the patient and me. Plan: The risks, benefits, expected outcome, and alternative to the recommended procedure have been discussed with the patient. Patient understands and wants to proceed with the procedure.      Electronically signed by Brittanie Payan MD on 6/9/2023 at 9:03 AM

## 2023-06-16 ENCOUNTER — TELEPHONE (OUTPATIENT)
Dept: CARDIOLOGY CLINIC | Age: 43
End: 2023-06-16

## 2023-06-16 PROBLEM — Z95.810 CARDIAC DEFIBRILLATOR IN SITU: Status: ACTIVE | Noted: 2023-06-16

## 2023-06-16 NOTE — TELEPHONE ENCOUNTER
Submitted PA for ENTRESTO  Via [facility] KEY  [id number] STATUS: PENDING. Follow up done daily; if no response in three days we will refax for status check. If another three days goes by with no response we will call the insurance for status.

## 2023-06-20 NOTE — TELEPHONE ENCOUNTER
Called angelina, spoke with [facility], please see attached letter.     Approved from 6/20/23-6/18/24, she had to start from today since they have no record of approval prior

## 2023-06-20 NOTE — TELEPHONE ENCOUNTER
The letter that was scanned in was not an approval. It was denial letter. Was there an appeal where it was approved or am I mistaken.

## 2023-07-10 ENCOUNTER — NURSE ONLY (OUTPATIENT)
Dept: CARDIOLOGY CLINIC | Age: 43
End: 2023-07-10

## 2023-07-10 ENCOUNTER — OFFICE VISIT (OUTPATIENT)
Dept: CARDIOLOGY CLINIC | Age: 43
End: 2023-07-10

## 2023-07-10 VITALS
DIASTOLIC BLOOD PRESSURE: 85 MMHG | HEART RATE: 86 BPM | HEIGHT: 71 IN | WEIGHT: 221 LBS | BODY MASS INDEX: 30.94 KG/M2 | OXYGEN SATURATION: 93 % | SYSTOLIC BLOOD PRESSURE: 125 MMHG

## 2023-07-10 DIAGNOSIS — I50.22 CHRONIC SYSTOLIC HEART FAILURE (HCC): ICD-10-CM

## 2023-07-10 DIAGNOSIS — I10 BENIGN ESSENTIAL HTN: ICD-10-CM

## 2023-07-10 DIAGNOSIS — Z95.810 ICD (IMPLANTABLE CARDIOVERTER-DEFIBRILLATOR), SINGLE, IN SITU: ICD-10-CM

## 2023-07-10 DIAGNOSIS — I42.8 NICM (NONISCHEMIC CARDIOMYOPATHY) (HCC): Primary | ICD-10-CM

## 2023-07-10 DIAGNOSIS — I21.4 NSTEMI (NON-ST ELEVATED MYOCARDIAL INFARCTION) (HCC): ICD-10-CM

## 2023-07-10 NOTE — PROGRESS NOTES
Patient comes in for a 1 month programming evaluation on their Medtronic SC ICD. Remote monitoring is active. Incision appears to have healed nicely. All sensing and pacing parameters appear unchanged since last in office check on 06.16.2023.  13.3 years estimated until MIO/RRT.  <1%  PVC 5.2/hr  Patient remains on eliquis, asa 81 mg, furosemide, metoprolol, entresto  Please see interrogation for more detail. Optivol is still initializing. Patient will see NPFW today. We will continue to monitor remotely.

## 2023-07-19 ENCOUNTER — TELEPHONE (OUTPATIENT)
Dept: CARDIOLOGY CLINIC | Age: 43
End: 2023-07-19

## 2023-07-19 NOTE — TELEPHONE ENCOUNTER
Patient called stating he would like a call regarding status on eligibility for Enstresto 49/51.  Patient states he only has three dosages left PMD

## 2023-07-19 NOTE — TELEPHONE ENCOUNTER
Submitted PA for ENTRESTO  Via [facility] (Key: [id number]) STATUS: PENDING. Follow up done daily; if no response in three days we will refax for status check. If another three days goes by with no response we will call the insurance for status.

## 2023-08-15 ENCOUNTER — OFFICE VISIT (OUTPATIENT)
Dept: PULMONOLOGY | Age: 43
End: 2023-08-15
Payer: COMMERCIAL

## 2023-08-15 VITALS
HEIGHT: 71 IN | DIASTOLIC BLOOD PRESSURE: 80 MMHG | SYSTOLIC BLOOD PRESSURE: 124 MMHG | OXYGEN SATURATION: 94 % | BODY MASS INDEX: 31.5 KG/M2 | WEIGHT: 225 LBS | HEART RATE: 90 BPM

## 2023-08-15 DIAGNOSIS — I42.8 NICM (NONISCHEMIC CARDIOMYOPATHY) (HCC): ICD-10-CM

## 2023-08-15 DIAGNOSIS — J44.9 MODERATE COPD (CHRONIC OBSTRUCTIVE PULMONARY DISEASE) (HCC): ICD-10-CM

## 2023-08-15 DIAGNOSIS — G47.33 OSA (OBSTRUCTIVE SLEEP APNEA): Primary | ICD-10-CM

## 2023-08-15 PROCEDURE — 99214 OFFICE O/P EST MOD 30 MIN: CPT | Performed by: INTERNAL MEDICINE

## 2023-08-15 PROCEDURE — 4004F PT TOBACCO SCREEN RCVD TLK: CPT | Performed by: INTERNAL MEDICINE

## 2023-08-15 PROCEDURE — 3023F SPIROM DOC REV: CPT | Performed by: INTERNAL MEDICINE

## 2023-08-15 PROCEDURE — G8427 DOCREV CUR MEDS BY ELIG CLIN: HCPCS | Performed by: INTERNAL MEDICINE

## 2023-08-15 PROCEDURE — G8417 CALC BMI ABV UP PARAM F/U: HCPCS | Performed by: INTERNAL MEDICINE

## 2023-08-15 ASSESSMENT — ENCOUNTER SYMPTOMS
WHEEZING: 0
COUGH: 0
CHEST TIGHTNESS: 0
SHORTNESS OF BREATH: 1

## 2023-08-16 LAB
ANION GAP SERPL CALCULATED.3IONS-SCNC: 11 MMOL/L (ref 3–16)
BUN SERPL-MCNC: 15 MG/DL (ref 7–20)
CALCIUM SERPL-MCNC: 9.9 MG/DL (ref 8.3–10.6)
CHLORIDE SERPL-SCNC: 102 MMOL/L (ref 99–110)
CO2 SERPL-SCNC: 28 MMOL/L (ref 21–32)
CREAT SERPL-MCNC: 0.8 MG/DL (ref 0.9–1.3)
GFR SERPLBLD CREATININE-BSD FMLA CKD-EPI: >60 ML/MIN/{1.73_M2}
GLUCOSE SERPL-MCNC: 73 MG/DL (ref 70–99)
POTASSIUM SERPL-SCNC: 4.5 MMOL/L (ref 3.5–5.1)
SODIUM SERPL-SCNC: 141 MMOL/L (ref 136–145)

## 2023-08-24 ENCOUNTER — HOSPITAL ENCOUNTER (OUTPATIENT)
Dept: SLEEP CENTER | Age: 43
Discharge: HOME | End: 2023-08-24
Payer: COMMERCIAL

## 2023-08-24 DIAGNOSIS — G47.33 OSA (OBSTRUCTIVE SLEEP APNEA): ICD-10-CM

## 2023-08-24 PROCEDURE — 95811 POLYSOM 6/>YRS CPAP 4/> PARM: CPT

## 2023-09-12 NOTE — PROGRESS NOTES
Cardiology Consult Service  Daily Progress Note        Admit Date:  2/8/2023    Subjective: Interval history:  Hypoxemia improving      Objective:     Medications:   levofloxacin  750 mg IntraVENous Q24H    lisinopril  2.5 mg Oral Daily    furosemide  40 mg Oral Daily    metoprolol succinate  25 mg Oral Daily    sodium chloride flush  5-40 mL IntraVENous 2 times per day    apixaban  10 mg Oral BID    Followed by    Beverli Breeding ON 2/17/2023] apixaban  5 mg Oral BID    sodium chloride flush  5-40 mL IntraVENous 2 times per day    aspirin  81 mg Oral Daily    rosuvastatin  40 mg Oral Nightly       IV drips:   sodium chloride         PRN:  sodium chloride flush, acetaminophen, sodium chloride flush, sodium chloride, ondansetron **OR** ondansetron, polyethylene glycol    Vitals:    02/13/23 2320 02/14/23 0503 02/14/23 0504 02/14/23 0804   BP: 101/70 100/68  93/66   Pulse: (!) 110 92  96   Resp: 18 18  18   Temp: 98.3 °F (36.8 °C) 98.8 °F (37.1 °C)  97.7 °F (36.5 °C)   TempSrc: Oral Oral  Oral   SpO2: 95% 94%  90%   Weight:   195 lb 8.8 oz (88.7 kg)    Height:           Intake/Output Summary (Last 24 hours) at 2/14/2023 0949  Last data filed at 2/14/2023 0804  Gross per 24 hour   Intake 1140 ml   Output 2275 ml   Net -1135 ml       I/O last 3 completed shifts: In: 1080 [P.O.:1080]  Out: 4225 [Urine:4225]  Wt Readings from Last 3 Encounters:   02/14/23 195 lb 8.8 oz (88.7 kg)       Admit Wt: Weight: 210 lb 1.6 oz (95.3 kg)   Todays Wt: Weight: 195 lb 8.8 oz (88.7 kg)      Physical Exam:     Physical Exam  Constitutional:       Appearance: Normal appearance. HENT:      Head: Normocephalic and atraumatic. Nose: Nose normal.   Eyes:      Conjunctiva/sclera: Conjunctivae normal.   Cardiovascular:      Rate and Rhythm: Normal rate and regular rhythm. Heart sounds: Normal heart sounds. Pulmonary:      Effort: Pulmonary effort is normal.      Breath sounds: Normal breath sounds.    Abdominal:      Palpations: Abdomen is soft. Musculoskeletal:      Cervical back: Neck supple. Skin:     General: Skin is warm and dry. Neurological:      General: No focal deficit present. Mental Status: He is alert. Labs:   Recent Labs     02/12/23 0524 02/13/23 0439 02/14/23 0513    135* 136   K 3.6 3.6 3.9   BUN 16 17 17   CREATININE 0.9 0.9 1.0   CL 96* 97* 97*   CO2 34* 30 30   GLUCOSE 83 93 95   CALCIUM 9.3 9.1 9.5       Recent Labs     02/12/23 0524 02/13/23 0439 02/14/23 0513   WBC 7.3 8.0 7.6   HGB 16.2 16.1 16.5   HCT 48.8 47.6 49.9    206 217   MCV 92.3 91.6 92.1       No results for input(s): CHOLTOT, TRIG, HDL, CHOLHDL, LDL in the last 72 hours. Invalid input(s): Arman Alert  No results for input(s): PTT, INR in the last 72 hours. Invalid input(s): PT  No results for input(s): CKTOTAL, CKMB, CKMBINDEX, TROPONINI in the last 72 hours. No results for input(s): BNP in the last 72 hours. No results for input(s): NTPROBNP in the last 72 hours. No results for input(s): TSH in the last 72 hours. Imaging:       Assessment & Plan:     Acute systolic heart failure LVEF <20%  Atypical Pneumonia  NSTEMI  Apical thrombus  Nonischemic cardiomyopathy  Coronary embolus    Continue betablocker, diuretic, lisinopril  BP on low side for further med titration including ARNI/MRA; SGLT2I consideration as outpatient  Continue quinolone, monitor for arrhythmias (high risk med)  LifeVest has been placed. Continue eliquis  Repeat CXR today    Thank you for allowing to us to participate in the brian or Dewayne Coreas. Please call our service with questions.     Mary Wagner MD, [facility]  The [address]  [address] [place] [address]  [address] [address]  Ph: [phone]  Fax: [phone] knee

## 2023-10-17 ENCOUNTER — OFFICE VISIT (OUTPATIENT)
Dept: CARDIOLOGY CLINIC | Age: 43
End: 2023-10-17
Payer: COMMERCIAL

## 2023-10-17 ENCOUNTER — NURSE ONLY (OUTPATIENT)
Dept: CARDIOLOGY CLINIC | Age: 43
End: 2023-10-17
Payer: COMMERCIAL

## 2023-10-17 VITALS
BODY MASS INDEX: 34.03 KG/M2 | HEART RATE: 71 BPM | WEIGHT: 244 LBS | SYSTOLIC BLOOD PRESSURE: 124 MMHG | DIASTOLIC BLOOD PRESSURE: 76 MMHG

## 2023-10-17 DIAGNOSIS — I50.22 CHRONIC SYSTOLIC HEART FAILURE (HCC): ICD-10-CM

## 2023-10-17 DIAGNOSIS — I25.10 CORONARY ARTERY DISEASE INVOLVING NATIVE CORONARY ARTERY OF NATIVE HEART WITHOUT ANGINA PECTORIS: ICD-10-CM

## 2023-10-17 DIAGNOSIS — Z95.810 CARDIAC DEFIBRILLATOR IN SITU: Primary | ICD-10-CM

## 2023-10-17 DIAGNOSIS — I24.0 CORONARY ARTERY THROMBOSIS (HCC): ICD-10-CM

## 2023-10-17 DIAGNOSIS — I42.8 NICM (NONISCHEMIC CARDIOMYOPATHY) (HCC): ICD-10-CM

## 2023-10-17 DIAGNOSIS — Z95.810 ICD (IMPLANTABLE CARDIOVERTER-DEFIBRILLATOR), SINGLE, IN SITU: ICD-10-CM

## 2023-10-17 DIAGNOSIS — I42.8 NICM (NONISCHEMIC CARDIOMYOPATHY) (HCC): Primary | ICD-10-CM

## 2023-10-17 PROCEDURE — G8484 FLU IMMUNIZE NO ADMIN: HCPCS | Performed by: NURSE PRACTITIONER

## 2023-10-17 PROCEDURE — 93282 PRGRMG EVAL IMPLANTABLE DFB: CPT | Performed by: INTERNAL MEDICINE

## 2023-10-17 PROCEDURE — 93000 ELECTROCARDIOGRAM COMPLETE: CPT | Performed by: NURSE PRACTITIONER

## 2023-10-17 PROCEDURE — 99214 OFFICE O/P EST MOD 30 MIN: CPT | Performed by: NURSE PRACTITIONER

## 2023-10-17 PROCEDURE — G8417 CALC BMI ABV UP PARAM F/U: HCPCS | Performed by: NURSE PRACTITIONER

## 2023-10-17 PROCEDURE — 4004F PT TOBACCO SCREEN RCVD TLK: CPT | Performed by: NURSE PRACTITIONER

## 2023-10-17 PROCEDURE — G8427 DOCREV CUR MEDS BY ELIG CLIN: HCPCS | Performed by: NURSE PRACTITIONER

## 2023-10-17 RX ORDER — MOMETASONE FUROATE AND FORMOTEROL FUMARATE DIHYDRATE 100; 5 UG/1; UG/1
AEROSOL RESPIRATORY (INHALATION)
COMMUNITY
Start: 2023-09-13

## 2023-10-17 NOTE — PROGRESS NOTES
[facility]   Electrophysiology  Office Visit  Date: 10/17/2023    Chief Complaint   Patient presents with    Cardiomyopathy    Congestive Heart Failure    Coronary Artery Disease    Hypertension     Cardiac HX: Delaney Willis is a 40 y.o. man with a h/o HTN, CKD, admitted (2/2023) with SOB/CP, noted to have an NSTEMI and coronary embolus, on Eliquis, s/p LHC (2/10/2023, Dr. Munir Romero) showed no CAD, EF <20%, placed on LifeVest, on Toprol XL 25 mg, Entresto 24-26 mg BID, repeat echo (5/6/2023) EF 20-25%, hospitalized for COPD/hypoxia 2 weeks later post NSTEMI, now on O2 @ 3 L NC, s/p single ch MDT ICD (6/9/2023, Dr Boyd Castellano). Interval History/HPI: Patient is here for follow-up for NICMP, chronic systolic CHF and ICD management. Patient was originally seen in February 2023 when he presented with shortness of breath and chest discomfort. He was found to have an NSTEMI and a coronary embolus. He had a left heart cath that showed no CAD and no intervention was performed. His EF was less than 20% per cath. He was placed on a LifeVest.  He was started on Toprol-XL 25 mg daily and Entresto 24-26 mg twice a day. His blood pressure was on the low side and was limiting up titration of medications. He was admitted 2 weeks after his initial NSTEMI for COPD and hypoxia and currently now is on chronic O2. He had a repeat echo on 5/6/2023 that showed his EF was 20 to 25%. He had a cardiac MRI that showed his EF was 30% along with delayed enhancement consistent with a scar. He was implanted with a single-chamber MDT ICD on 6/9/2023. He presents today in NSR. Heart rate 71. Review of his device today shows less than 0.1% , 1 SVT that lasted about 13 minutes, OptiVol within normal limits, other parameters stable. Left chest incision healed well. Was complaining of some discomfort around his incision site at the 1 month follow-up. He is no longer having discomfort.                                             His

## 2023-11-25 PROCEDURE — 93297 REM INTERROG DEV EVAL ICPMS: CPT | Performed by: NURSE PRACTITIONER

## 2023-11-25 PROCEDURE — G2066 INTER DEVC REMOTE 30D: HCPCS | Performed by: NURSE PRACTITIONER

## 2023-12-07 ENCOUNTER — OFFICE VISIT (OUTPATIENT)
Dept: PULMONOLOGY | Age: 43
End: 2023-12-07
Payer: COMMERCIAL

## 2023-12-07 VITALS
WEIGHT: 229 LBS | HEART RATE: 77 BPM | HEIGHT: 71 IN | BODY MASS INDEX: 32.06 KG/M2 | OXYGEN SATURATION: 94 % | SYSTOLIC BLOOD PRESSURE: 118 MMHG | DIASTOLIC BLOOD PRESSURE: 82 MMHG

## 2023-12-07 DIAGNOSIS — G47.33 OSA (OBSTRUCTIVE SLEEP APNEA): Primary | ICD-10-CM

## 2023-12-07 PROCEDURE — G8484 FLU IMMUNIZE NO ADMIN: HCPCS | Performed by: INTERNAL MEDICINE

## 2023-12-07 PROCEDURE — 99214 OFFICE O/P EST MOD 30 MIN: CPT | Performed by: INTERNAL MEDICINE

## 2023-12-07 PROCEDURE — G8427 DOCREV CUR MEDS BY ELIG CLIN: HCPCS | Performed by: INTERNAL MEDICINE

## 2023-12-07 PROCEDURE — G8417 CALC BMI ABV UP PARAM F/U: HCPCS | Performed by: INTERNAL MEDICINE

## 2023-12-07 PROCEDURE — 4004F PT TOBACCO SCREEN RCVD TLK: CPT | Performed by: INTERNAL MEDICINE

## 2023-12-07 ASSESSMENT — ENCOUNTER SYMPTOMS
WHEEZING: 0
SHORTNESS OF BREATH: 1
COUGH: 0
CHEST TIGHTNESS: 0

## 2023-12-07 NOTE — PROGRESS NOTES
occluded distal D3 due to embolization  3. Low right and left sided pressure/normal co/ci      RECOMMENDATIONS:    1. Routine post cardiac catheterization care  2. Aggressive risk factor modification    Echo : 2/9/23  Conclusions    Summary   Severe Left ventricular enlargement. Severely decreased left ventricular systolic function with an estimated   ejection fraction of <20%. Global hypokinesis. Abnormal diastolic filling pattern. There appear to be multiple thrombi within the left ventricular apex. The mitral valve is normal in structure. Mild mitral regurgitation. No evidence of mitral stenosis. The left atrium is dilated. Thickened aortic valve leaflets. There is no significant aortic valve regurgitation or stenosis. The aortic root is mildly dilated measuring 3.8 cm. The right ventricle appears dilated with decreased systolic function. Tapse: 2.09 cm. RV s: 12.2 cm/s   The tricuspid valve is normal in structure. Mild tricuspid regurgitation. No evidence of tricuspid stenosis. IVC size is normal (<2.1cm) and collapses > 50% with respiration consistent   with normal RA pressure (3mmHg). Estimated pulmonary artery systolic pressure is at 45 mmHg assuming a right   atrial pressure of 3 mmHg. Definity contrast agent was used to help visualize endocardial borders. Assessment:      Diagnosis Orders   1. FITO (obstructive sleep apnea)          Plan:   44year old male, recently diagnosed with NICM with EF less than 20% s/p AICD   He was referred for evaluation of hypoxia. PFT with moderate obstruction with significant air trapping and normal DLCO. Right heart cath with normal pulmonary pressure, making PE less likely. He is heavy smoker. He is cutting back. Currently smoking ~4 cigarettes a day. He is on Chantix. On prior CT in Feb 2023 there was GGO opacity. This has resolved. Clinically does not appear to be volume overloaded.   RHC with PW12, and PA 22.    CT
Currently NPO < 5 days

## 2023-12-26 PROCEDURE — 93297 REM INTERROG DEV EVAL ICPMS: CPT | Performed by: NURSE PRACTITIONER

## 2023-12-26 PROCEDURE — G2066 INTER DEVC REMOTE 30D: HCPCS | Performed by: NURSE PRACTITIONER

## 2024-01-18 NOTE — PROGRESS NOTES
[facility]     Outpatient Cardiology         Patient Name:  Hank Key  Requesting Physician: No admitting provider for patient encounter.  Primary Care Physician: Jensen Burch    Reason for Consultation/Chief Complaint:   Chief Complaint   Patient presents with    Follow-up     3 month follow up      HPI:     Hank Key is a 43 y.o. male with a history of CHF EF <20%, single chamber MDT ICD 6/9/2023 Dr. Caballero, Nonischemic cardiomyopathy, HTN, CKD, presents for follow up.     Reports some sob, bendopnea.  No weight gain.  Still smoking.      Patient admitted to [facility] 2/8/23-2/15/23 with c/o SOB and chest pain. Found to have NSTEMI and coronary embolus. Underwent LHC, no intervention. LVEF <20% discharged with Lifevest. At his follow up on 5/9/23, he was started on Entresto 24-26.     LHC, 2/10/23, Normal coronaries.     Tobacco use.      Histories:     Past Medical History:   has a past medical history of Chronic kidney disease (CKD), Hemodialysis patient (HCC), and Hypertension.    Surgical History:   has no past surgical history on file.     Social History:   reports that he has been smoking cigarettes. He has a 22.0 pack-year smoking history. He has been exposed to tobacco smoke. He has never used smokeless tobacco. He reports that he does not drink alcohol and does not use drugs.     Family History:  No evidence for sudden cardiac death or premature CAD    Medications:     Home Medications:  Were reviewed and are listed in nursing record. and/or listed below    Prior to Admission medications    Medication Sig Start Date End Date Taking? Authorizing Provider   methocarbamol (ROBAXIN) 750 MG tablet TAKE ONE TABLET BY MOUTH EVERY EIGHT HOURS AS NEEDED FOR MUSCLE CRAMPS 12/26/23  Yes Provider, Heather, MD   DULERA 100-5 MCG/ACT inhaler INHALE 2 PUFFS BY MOUTH TWO TIMES A DAY 9/13/23  Yes Provider, MD Heather   sacubitril-valsartan (ENTRESTO) 49-51 MG per tablet Take 1 tablet by

## 2024-01-19 ENCOUNTER — OFFICE VISIT (OUTPATIENT)
Dept: CARDIOLOGY CLINIC | Age: 44
End: 2024-01-19
Payer: COMMERCIAL

## 2024-01-19 VITALS
BODY MASS INDEX: 31.93 KG/M2 | WEIGHT: 228.8 LBS | SYSTOLIC BLOOD PRESSURE: 124 MMHG | HEART RATE: 76 BPM | DIASTOLIC BLOOD PRESSURE: 86 MMHG

## 2024-01-19 DIAGNOSIS — Z95.810 CARDIAC DEFIBRILLATOR IN SITU: ICD-10-CM

## 2024-01-19 DIAGNOSIS — Z79.01 ON CONTINUOUS ORAL ANTICOAGULATION: ICD-10-CM

## 2024-01-19 DIAGNOSIS — I50.22 CHRONIC SYSTOLIC HEART FAILURE (HCC): Primary | ICD-10-CM

## 2024-01-19 DIAGNOSIS — I10 BENIGN ESSENTIAL HTN: ICD-10-CM

## 2024-01-19 DIAGNOSIS — E78.2 MIXED HYPERLIPIDEMIA: ICD-10-CM

## 2024-01-19 DIAGNOSIS — I50.22 CHRONIC SYSTOLIC HEART FAILURE (HCC): ICD-10-CM

## 2024-01-19 DIAGNOSIS — I24.0 CORONARY ARTERY THROMBOSIS (HCC): ICD-10-CM

## 2024-01-19 PROCEDURE — 3074F SYST BP LT 130 MM HG: CPT | Performed by: INTERNAL MEDICINE

## 2024-01-19 PROCEDURE — G8484 FLU IMMUNIZE NO ADMIN: HCPCS | Performed by: INTERNAL MEDICINE

## 2024-01-19 PROCEDURE — G8417 CALC BMI ABV UP PARAM F/U: HCPCS | Performed by: INTERNAL MEDICINE

## 2024-01-19 PROCEDURE — 4004F PT TOBACCO SCREEN RCVD TLK: CPT | Performed by: INTERNAL MEDICINE

## 2024-01-19 PROCEDURE — G8427 DOCREV CUR MEDS BY ELIG CLIN: HCPCS | Performed by: INTERNAL MEDICINE

## 2024-01-19 PROCEDURE — 99214 OFFICE O/P EST MOD 30 MIN: CPT | Performed by: INTERNAL MEDICINE

## 2024-01-19 PROCEDURE — 3079F DIAST BP 80-89 MM HG: CPT | Performed by: INTERNAL MEDICINE

## 2024-01-19 RX ORDER — METHOCARBAMOL 750 MG/1
TABLET, FILM COATED ORAL
COMMUNITY
Start: 2023-12-26

## 2024-01-20 LAB
ALBUMIN SERPL-MCNC: 5.2 G/DL (ref 3.4–5)
ALBUMIN/GLOB SERPL: 1.9 {RATIO} (ref 1.1–2.2)
ALP SERPL-CCNC: 98 U/L (ref 40–129)
ALT SERPL-CCNC: 30 U/L (ref 10–40)
ANION GAP SERPL CALCULATED.3IONS-SCNC: 10 MMOL/L (ref 3–16)
AST SERPL-CCNC: 32 U/L (ref 15–37)
BASOPHILS # BLD: 0.1 K/UL (ref 0–0.2)
BASOPHILS NFR BLD: 1 %
BILIRUB SERPL-MCNC: 0.5 MG/DL (ref 0–1)
BUN SERPL-MCNC: 16 MG/DL (ref 7–20)
CALCIUM SERPL-MCNC: 9.3 MG/DL (ref 8.3–10.6)
CHLORIDE SERPL-SCNC: 101 MMOL/L (ref 99–110)
CHOLEST SERPL-MCNC: 146 MG/DL (ref 0–199)
CO2 SERPL-SCNC: 31 MMOL/L (ref 21–32)
CREAT SERPL-MCNC: 0.8 MG/DL (ref 0.9–1.3)
DEPRECATED RDW RBC AUTO: 14.2 % (ref 12.4–15.4)
EOSINOPHIL # BLD: 0.1 K/UL (ref 0–0.6)
EOSINOPHIL NFR BLD: 1.7 %
GFR SERPLBLD CREATININE-BSD FMLA CKD-EPI: >60 ML/MIN/{1.73_M2}
GLUCOSE SERPL-MCNC: 87 MG/DL (ref 70–99)
HCT VFR BLD AUTO: 55.3 % (ref 40.5–52.5)
HDLC SERPL-MCNC: 35 MG/DL (ref 40–60)
HGB BLD-MCNC: 18.8 G/DL (ref 13.5–17.5)
LDLC SERPL CALC-MCNC: 91 MG/DL
LYMPHOCYTES # BLD: 2.8 K/UL (ref 1–5.1)
LYMPHOCYTES NFR BLD: 32.5 %
MCH RBC QN AUTO: 31.7 PG (ref 26–34)
MCHC RBC AUTO-ENTMCNC: 34 G/DL (ref 31–36)
MCV RBC AUTO: 93.3 FL (ref 80–100)
MONOCYTES # BLD: 0.6 K/UL (ref 0–1.3)
MONOCYTES NFR BLD: 6.7 %
NEUTROPHILS # BLD: 5.1 K/UL (ref 1.7–7.7)
NEUTROPHILS NFR BLD: 58.1 %
NT-PROBNP SERPL-MCNC: <36 PG/ML (ref 0–124)
PLATELET # BLD AUTO: 173 K/UL (ref 135–450)
PMV BLD AUTO: 9.7 FL (ref 5–10.5)
POTASSIUM SERPL-SCNC: 5.1 MMOL/L (ref 3.5–5.1)
PROT SERPL-MCNC: 7.9 G/DL (ref 6.4–8.2)
RBC # BLD AUTO: 5.93 M/UL (ref 4.2–5.9)
SODIUM SERPL-SCNC: 142 MMOL/L (ref 136–145)
TRIGL SERPL-MCNC: 100 MG/DL (ref 0–150)
TSH SERPL DL<=0.005 MIU/L-ACNC: 1.21 UIU/ML (ref 0.27–4.2)
VLDLC SERPL CALC-MCNC: 20 MG/DL
WBC # BLD AUTO: 8.7 K/UL (ref 4–11)

## 2024-01-23 PROCEDURE — 93295 DEV INTERROG REMOTE 1/2/MLT: CPT | Performed by: INTERNAL MEDICINE

## 2024-01-23 PROCEDURE — 93296 REM INTERROG EVL PM/IDS: CPT | Performed by: INTERNAL MEDICINE

## 2024-01-26 PROCEDURE — 93297 REM INTERROG DEV EVAL ICPMS: CPT | Performed by: NURSE PRACTITIONER

## 2024-02-02 ENCOUNTER — TELEPHONE (OUTPATIENT)
Dept: CARDIOLOGY CLINIC | Age: 44
End: 2024-02-02

## 2024-02-02 DIAGNOSIS — I50.22 CHRONIC SYSTOLIC HEART FAILURE (HCC): Primary | ICD-10-CM

## 2024-02-02 NOTE — PROGRESS NOTES
[facility]     Outpatient Cardiology         Patient Name:  Hank Key  Requesting Physician: No admitting provider for patient encounter.  Primary Care Physician: Jensen Burch    Reason for Consultation/Chief Complaint:   Chief Complaint   Patient presents with    Follow-up    Congestive Heart Failure    Hypertension    Cardiomyopathy     HPI:     Hank Key is a 43 y.o. male with a history of CHF EF <20%, single chamber MDT ICD 6/9/2023 Dr. Caballero, Nonischemic cardiomyopathy, HTN, CKD, presents for follow up.     Today he reports that he has been tolerating his increased dose of Entresto well without side effects. Patient currently denies any weight gain, edema, palpitations, chest pain, shortness of breath, dizziness, and syncope.     Echo 2/7/24:   Summary   Left ventricular cavity size is normal.   Normal left ventricular wall thickness.   Left ventricular function is mildly reduced with ejection fraction estimated   at 45-50%.   Anteroseptal wall appears hypokinetic.   Bee not well visualized.    Patient admitted to [facility] 2/8/23-2/15/23 with c/o SOB and chest pain. Found to have NSTEMI and coronary embolus. Underwent LHC, no intervention. LVEF <20% discharged with Lifevest. At his follow up on 5/9/23, he was started on Entresto 24-26.     LHC, 2/10/23, Normal coronaries.     Tobacco use.      Histories:     Past Medical History:   has a past medical history of Chronic kidney disease (CKD), Hemodialysis patient (HCC), and Hypertension.    Surgical History:   has no past surgical history on file.     Social History:   reports that he has been smoking cigarettes. He has a 22.0 pack-year smoking history. He has been exposed to tobacco smoke. He has never used smokeless tobacco. He reports that he does not drink alcohol and does not use drugs.     Family History:  No evidence for sudden cardiac death or premature CAD    Medications:     Home Medications:  Were reviewed and are listed

## 2024-02-02 NOTE — TELEPHONE ENCOUNTER
Pt called. BP has been 117-120's/70's with Entresto 49/51 TID. Will increase to 97/103 BID per note from Dr. Blancas. Will get labs next week.

## 2024-02-05 ENCOUNTER — TELEPHONE (OUTPATIENT)
Dept: CARDIOLOGY CLINIC | Age: 44
End: 2024-02-05

## 2024-02-05 NOTE — TELEPHONE ENCOUNTER
Patient called stating his pharmacy wont refill his Entresto 97-103mg medication due to it not being approved. Pt said it was changed fro 49-51mg to 97-103mg.   Pt cell: [phone]

## 2024-02-07 ENCOUNTER — OFFICE VISIT (OUTPATIENT)
Dept: CARDIOLOGY CLINIC | Age: 44
End: 2024-02-07
Payer: COMMERCIAL

## 2024-02-07 ENCOUNTER — PROCEDURE VISIT (OUTPATIENT)
Dept: CARDIOLOGY CLINIC | Age: 44
End: 2024-02-07
Payer: COMMERCIAL

## 2024-02-07 VITALS
DIASTOLIC BLOOD PRESSURE: 84 MMHG | WEIGHT: 232.3 LBS | OXYGEN SATURATION: 93 % | SYSTOLIC BLOOD PRESSURE: 128 MMHG | BODY MASS INDEX: 32.41 KG/M2 | HEART RATE: 73 BPM

## 2024-02-07 DIAGNOSIS — E78.2 MIXED HYPERLIPIDEMIA: ICD-10-CM

## 2024-02-07 DIAGNOSIS — I24.0 CORONARY ARTERY THROMBOSIS (HCC): ICD-10-CM

## 2024-02-07 DIAGNOSIS — I50.22 CHRONIC SYSTOLIC HEART FAILURE (HCC): Primary | ICD-10-CM

## 2024-02-07 DIAGNOSIS — I50.22 CHRONIC SYSTOLIC HEART FAILURE (HCC): ICD-10-CM

## 2024-02-07 DIAGNOSIS — Z79.01 ON CONTINUOUS ORAL ANTICOAGULATION: ICD-10-CM

## 2024-02-07 DIAGNOSIS — I42.8 NICM (NONISCHEMIC CARDIOMYOPATHY) (HCC): Primary | ICD-10-CM

## 2024-02-07 DIAGNOSIS — I10 BENIGN ESSENTIAL HTN: ICD-10-CM

## 2024-02-07 DIAGNOSIS — Z95.810 CARDIAC DEFIBRILLATOR IN SITU: ICD-10-CM

## 2024-02-07 LAB
REASON FOR REJECTION: NORMAL
REJECTED TEST: NORMAL

## 2024-02-07 PROCEDURE — G8417 CALC BMI ABV UP PARAM F/U: HCPCS | Performed by: INTERNAL MEDICINE

## 2024-02-07 PROCEDURE — 4004F PT TOBACCO SCREEN RCVD TLK: CPT | Performed by: INTERNAL MEDICINE

## 2024-02-07 PROCEDURE — G8427 DOCREV CUR MEDS BY ELIG CLIN: HCPCS | Performed by: INTERNAL MEDICINE

## 2024-02-07 PROCEDURE — G8484 FLU IMMUNIZE NO ADMIN: HCPCS | Performed by: INTERNAL MEDICINE

## 2024-02-07 PROCEDURE — 93306 TTE W/DOPPLER COMPLETE: CPT | Performed by: INTERNAL MEDICINE

## 2024-02-07 PROCEDURE — 99214 OFFICE O/P EST MOD 30 MIN: CPT | Performed by: INTERNAL MEDICINE

## 2024-02-07 NOTE — PATIENT INSTRUCTIONS
Reviewed echocardiogram   Cardiac MRI   BP at goal, continue Entresto  Follow up with me after MRI       Call [phone]

## 2024-02-07 NOTE — TELEPHONE ENCOUNTER
Submitted PA for Entresto 97-103MG tablets  Via [facility]  (Key: [id number]) STATUS: PENDING.    Follow up done daily; if no decision with in three days we will refax.  If another three days goes by with no decision will call the insurance for status.

## 2024-02-08 ENCOUNTER — TELEPHONE (OUTPATIENT)
Dept: CARDIOLOGY CLINIC | Age: 44
End: 2024-02-08

## 2024-02-08 DIAGNOSIS — I42.8 NICM (NONISCHEMIC CARDIOMYOPATHY) (HCC): Primary | ICD-10-CM

## 2024-02-26 ENCOUNTER — TELEPHONE (OUTPATIENT)
Dept: CARDIOLOGY CLINIC | Age: 44
End: 2024-02-26

## 2024-02-26 NOTE — TELEPHONE ENCOUNTER
Pt called to review with clinical staff any and all restrictions associated with his MRI scheduled on 03.12.2024    [phone]

## 2024-02-27 NOTE — TELEPHONE ENCOUNTER
Spoke with pt. Cardiac MRI instructions per MRI department:     No caffeine 12-24 hours prior, NPO 4 hours no, do not take beta-blockers the day of.

## 2024-02-29 ENCOUNTER — TELEPHONE (OUTPATIENT)
Dept: CARDIOLOGY CLINIC | Age: 44
End: 2024-02-29

## 2024-02-29 DIAGNOSIS — I42.8 NICM (NONISCHEMIC CARDIOMYOPATHY) (HCC): ICD-10-CM

## 2024-02-29 DIAGNOSIS — I21.4 NSTEMI (NON-ST ELEVATED MYOCARDIAL INFARCTION) (HCC): ICD-10-CM

## 2024-02-29 RX ORDER — METOPROLOL SUCCINATE 25 MG/1
25 TABLET, EXTENDED RELEASE ORAL 2 TIMES DAILY
Qty: 180 TABLET | Refills: 3 | Status: SHIPPED | OUTPATIENT
Start: 2024-02-29

## 2024-02-29 RX ORDER — ROSUVASTATIN CALCIUM 40 MG/1
40 TABLET, COATED ORAL NIGHTLY
Qty: 90 TABLET | Refills: 3 | Status: SHIPPED | OUTPATIENT
Start: 2024-02-29

## 2024-02-29 RX ORDER — DAPAGLIFLOZIN 10 MG/1
10 TABLET, FILM COATED ORAL EVERY MORNING
Qty: 90 TABLET | Refills: 3 | Status: SHIPPED | OUTPATIENT
Start: 2024-02-29

## 2024-02-29 RX ORDER — FUROSEMIDE 40 MG/1
40 TABLET ORAL DAILY
Qty: 180 TABLET | Refills: 3 | Status: SHIPPED | OUTPATIENT
Start: 2024-02-29

## 2024-02-29 RX ORDER — ASPIRIN 81 MG/1
81 TABLET, CHEWABLE ORAL DAILY
Qty: 90 TABLET | Refills: 3 | Status: SHIPPED | OUTPATIENT
Start: 2024-02-29

## 2024-03-12 ENCOUNTER — HOSPITAL ENCOUNTER (OUTPATIENT)
Dept: MRI IMAGING | Age: 44
Discharge: HOME | End: 2024-03-12
Attending: INTERNAL MEDICINE
Payer: COMMERCIAL

## 2024-03-12 DIAGNOSIS — I42.8 NICM (NONISCHEMIC CARDIOMYOPATHY) (HCC): ICD-10-CM

## 2024-03-12 DIAGNOSIS — I50.22 CHRONIC SYSTOLIC HEART FAILURE (HCC): ICD-10-CM

## 2024-03-12 DIAGNOSIS — E78.2 MIXED HYPERLIPIDEMIA: ICD-10-CM

## 2024-03-12 LAB
ALBUMIN SERPL-MCNC: 4.7 G/DL (ref 3.4–5)
ALBUMIN/GLOB SERPL: 1.7 {RATIO} (ref 1.1–2.2)
ALP SERPL-CCNC: 91 U/L (ref 40–129)
ALT SERPL-CCNC: 31 U/L (ref 10–40)
ANION GAP SERPL CALCULATED.3IONS-SCNC: 15 MMOL/L (ref 3–16)
AST SERPL-CCNC: 29 U/L (ref 15–37)
BILIRUB SERPL-MCNC: 0.4 MG/DL (ref 0–1)
BUN SERPL-MCNC: 12 MG/DL (ref 7–20)
CALCIUM SERPL-MCNC: 9.4 MG/DL (ref 8.3–10.6)
CHLORIDE SERPL-SCNC: 103 MMOL/L (ref 99–110)
CO2 SERPL-SCNC: 23 MMOL/L (ref 21–32)
CREAT SERPL-MCNC: 0.7 MG/DL (ref 0.9–1.3)
GFR SERPLBLD CREATININE-BSD FMLA CKD-EPI: >60 ML/MIN/{1.73_M2}
GLUCOSE SERPL-MCNC: 87 MG/DL (ref 70–99)
POTASSIUM SERPL-SCNC: 4.1 MMOL/L (ref 3.5–5.1)
PROT SERPL-MCNC: 7.4 G/DL (ref 6.4–8.2)
SODIUM SERPL-SCNC: 141 MMOL/L (ref 136–145)

## 2024-03-12 PROCEDURE — 6360000004 HC RX CONTRAST MEDICATION: Performed by: INTERNAL MEDICINE

## 2024-03-12 PROCEDURE — 75565 CARD MRI VELOC FLOW MAPPING: CPT

## 2024-03-12 PROCEDURE — A9585 GADOBUTROL INJECTION: HCPCS | Performed by: INTERNAL MEDICINE

## 2024-03-12 RX ORDER — GADOBUTROL 604.72 MG/ML
18 INJECTION INTRAVENOUS
Status: COMPLETED | OUTPATIENT
Start: 2024-03-12 | End: 2024-03-12

## 2024-03-12 RX ADMIN — GADOBUTROL 18 ML: 604.72 INJECTION INTRAVENOUS at 14:31

## 2024-03-14 NOTE — PROGRESS NOTES
[facility]     Outpatient Cardiology         Patient Name:  Hank Key  Requesting Physician: No admitting provider for patient encounter.  Primary Care Physician: Jensen Burch    Reason for Consultation/Chief Complaint:   Chief Complaint   Patient presents with    Follow-up    Cardiomyopathy    Hypertension     HPI:     Hank Key is a 43 y.o. male with a history of CHF EF <20%, single chamber MDT ICD 6/9/2023 Dr. Caballero, Nonischemic cardiomyopathy, HTN, CKD, presents for follow up.     Today he reports that he has been feeling well from a cardiac standpoint. He is tolerating his medications well without side effects. He does complain of fatigue but attributes this to his medications as well as deconditioning. He does walk frequently and tolerates this well. Patient currently denies any weight gain, edema, palpitations, chest pain, shortness of breath, dizziness, and syncope.     Echo 2/7/24:   Summary   Left ventricular cavity size is normal.   Normal left ventricular wall thickness.   Left ventricular function is mildly reduced with ejection fraction estimated   at 45-50%.   Anteroseptal wall appears hypokinetic.   [place] not well visualized.    Patient admitted to [facility] 2/8/23-2/15/23 with c/o SOB and chest pain. Found to have NSTEMI and coronary embolus. Underwent LHC, no intervention. LVEF <20% discharged with Lifevest. At his follow up on 5/9/23, he was started on Entresto 24-26.     LHC, 2/10/23, Normal coronaries.     Tobacco use.      Histories:     Past Medical History:   has a past medical history of Chronic kidney disease (CKD), Encounter for imaging to screen for metal prior to MRI, Hemodialysis patient (HCC), and Hypertension.    Surgical History:   has no past surgical history on file.     Social History:   reports that he has been smoking cigarettes. He has a 22.0 pack-year smoking history. He has been exposed to tobacco smoke. He has never used smokeless tobacco. He

## 2024-03-15 ENCOUNTER — OFFICE VISIT (OUTPATIENT)
Dept: CARDIOLOGY CLINIC | Age: 44
End: 2024-03-15
Payer: COMMERCIAL

## 2024-03-15 VITALS
OXYGEN SATURATION: 97 % | SYSTOLIC BLOOD PRESSURE: 122 MMHG | BODY MASS INDEX: 31.09 KG/M2 | DIASTOLIC BLOOD PRESSURE: 78 MMHG | HEART RATE: 72 BPM | WEIGHT: 222.8 LBS

## 2024-03-15 DIAGNOSIS — I25.10 CORONARY ARTERY DISEASE INVOLVING NATIVE CORONARY ARTERY OF NATIVE HEART WITHOUT ANGINA PECTORIS: ICD-10-CM

## 2024-03-15 DIAGNOSIS — I42.8 NICM (NONISCHEMIC CARDIOMYOPATHY) (HCC): Primary | ICD-10-CM

## 2024-03-15 DIAGNOSIS — I50.22 CHRONIC SYSTOLIC HEART FAILURE (HCC): ICD-10-CM

## 2024-03-15 DIAGNOSIS — E78.2 MIXED HYPERLIPIDEMIA: ICD-10-CM

## 2024-03-15 PROCEDURE — G8427 DOCREV CUR MEDS BY ELIG CLIN: HCPCS | Performed by: INTERNAL MEDICINE

## 2024-03-15 PROCEDURE — G8484 FLU IMMUNIZE NO ADMIN: HCPCS | Performed by: INTERNAL MEDICINE

## 2024-03-15 PROCEDURE — 4004F PT TOBACCO SCREEN RCVD TLK: CPT | Performed by: INTERNAL MEDICINE

## 2024-03-15 PROCEDURE — 99214 OFFICE O/P EST MOD 30 MIN: CPT | Performed by: INTERNAL MEDICINE

## 2024-03-15 PROCEDURE — G8417 CALC BMI ABV UP PARAM F/U: HCPCS | Performed by: INTERNAL MEDICINE

## 2024-03-15 NOTE — PATIENT INSTRUCTIONS
Reviewed preliminary MRI results.   BP at goal, continue all medications as prescribed   Continue Entresto , Farxiga and Toprol   Continue statin   Follow up with me in 6 months

## 2024-03-25 NOTE — PROGRESS NOTES
mmHg. There was no gradient across the aortic valve upon pullback.  The left main coronary artery arises from the left coronary cusp giving rise to the left anterior descending artery and the left circumflex artery.  The left main reveals normal coronaries. Distal D3 appears occluded.  The left anterior descending artery arises in normal fashion from left coronary giving rise to diagonals and septal branches.  The LAD reveals normal coronaries.  The left circumflex/OM system reveals normal coronaries.  The right coronary artery is a right dominant vessel with no significant stenosis.  CONCLUSIONS:  Normal coronaries  2.   Probably occluded distal D3 due to embolization  3.   Low right and left sided pressure/normal co/ci  The MCOT, echocardiogram, stress test, and coronary angiography/PCI were reviewed by myself and used for my plan of care.     The CIED was interrogated and programmed and I supervised and reviewed all the data. All findings and changes are in device interrogation sheat and reflect my personal interpretation and changes and is scanned to Epic.    - The patient is counseled to follow a low salt diet to assure blood pressure remains controlled for cardiovascular risk factor modification.   - The patient is counseled to avoid excess caffeine, and energy drinks as this may exacerbated ectopy and arrhythmia.   - The patient is counseled to get regular exercise 3-5 times per week to control cardiovascular risk factors.   - The patient is counseled to lose weigt to control cardiovascular risk factors.  -The patient is counseled about the health hazards of smoking including cardiovascular side effects and its impact on morbidity and mortality.      Thank you for allowing me to participate in the care of Hank Key. All questions and concerns were addressed to the patient/family. Alternatives to my treatment were discussed.     I, Georgina Aguilar RN, am scribing for and in the presence of Dr. Holt

## 2024-04-11 ENCOUNTER — NURSE ONLY (OUTPATIENT)
Dept: CARDIOLOGY CLINIC | Age: 44
End: 2024-04-11
Payer: COMMERCIAL

## 2024-04-11 ENCOUNTER — OFFICE VISIT (OUTPATIENT)
Dept: CARDIOLOGY CLINIC | Age: 44
End: 2024-04-11
Payer: COMMERCIAL

## 2024-04-11 VITALS
SYSTOLIC BLOOD PRESSURE: 128 MMHG | BODY MASS INDEX: 31.37 KG/M2 | WEIGHT: 224.8 LBS | DIASTOLIC BLOOD PRESSURE: 70 MMHG | HEART RATE: 68 BPM

## 2024-04-11 DIAGNOSIS — I50.22 CHRONIC SYSTOLIC HEART FAILURE (HCC): ICD-10-CM

## 2024-04-11 DIAGNOSIS — I42.8 NICM (NONISCHEMIC CARDIOMYOPATHY) (HCC): ICD-10-CM

## 2024-04-11 DIAGNOSIS — Z95.810 CARDIAC DEFIBRILLATOR IN SITU: Primary | ICD-10-CM

## 2024-04-11 DIAGNOSIS — Z95.810 ICD (IMPLANTABLE CARDIOVERTER-DEFIBRILLATOR), SINGLE, IN SITU: Primary | ICD-10-CM

## 2024-04-11 DIAGNOSIS — I10 BENIGN ESSENTIAL HTN: ICD-10-CM

## 2024-04-11 DIAGNOSIS — E78.2 MIXED HYPERLIPIDEMIA: ICD-10-CM

## 2024-04-11 DIAGNOSIS — I24.0 CORONARY ARTERY THROMBOSIS (HCC): ICD-10-CM

## 2024-04-11 PROCEDURE — 3074F SYST BP LT 130 MM HG: CPT | Performed by: INTERNAL MEDICINE

## 2024-04-11 PROCEDURE — 93282 PRGRMG EVAL IMPLANTABLE DFB: CPT | Performed by: INTERNAL MEDICINE

## 2024-04-11 PROCEDURE — 93000 ELECTROCARDIOGRAM COMPLETE: CPT | Performed by: INTERNAL MEDICINE

## 2024-04-11 PROCEDURE — 3078F DIAST BP <80 MM HG: CPT | Performed by: INTERNAL MEDICINE

## 2024-04-11 PROCEDURE — G8427 DOCREV CUR MEDS BY ELIG CLIN: HCPCS | Performed by: INTERNAL MEDICINE

## 2024-04-11 PROCEDURE — G8417 CALC BMI ABV UP PARAM F/U: HCPCS | Performed by: INTERNAL MEDICINE

## 2024-04-11 PROCEDURE — 99214 OFFICE O/P EST MOD 30 MIN: CPT | Performed by: INTERNAL MEDICINE

## 2024-04-11 PROCEDURE — 4004F PT TOBACCO SCREEN RCVD TLK: CPT | Performed by: INTERNAL MEDICINE

## 2024-04-23 PROCEDURE — 93295 DEV INTERROG REMOTE 1/2/MLT: CPT | Performed by: INTERNAL MEDICINE

## 2024-04-23 PROCEDURE — 93296 REM INTERROG EVL PM/IDS: CPT | Performed by: INTERNAL MEDICINE

## 2024-09-04 ENCOUNTER — TELEPHONE (OUTPATIENT)
Dept: CARDIOLOGY CLINIC | Age: 44
End: 2024-09-04

## 2024-09-04 RX ORDER — METOPROLOL SUCCINATE 25 MG/1
TABLET, EXTENDED RELEASE ORAL
Qty: 180 TABLET | Refills: 3 | Status: SHIPPED | OUTPATIENT
Start: 2024-09-04

## 2024-09-04 NOTE — TELEPHONE ENCOUNTER
Pt called stating that he is completely out of medication and was checking up on the status of this refill

## 2024-09-27 ENCOUNTER — OFFICE VISIT (OUTPATIENT)
Dept: CARDIOLOGY CLINIC | Age: 44
End: 2024-09-27
Payer: COMMERCIAL

## 2024-09-27 VITALS
BODY MASS INDEX: 30.28 KG/M2 | HEART RATE: 71 BPM | DIASTOLIC BLOOD PRESSURE: 60 MMHG | SYSTOLIC BLOOD PRESSURE: 110 MMHG | WEIGHT: 217 LBS | OXYGEN SATURATION: 93 %

## 2024-09-27 DIAGNOSIS — I42.9 CARDIOMYOPATHY, UNSPECIFIED TYPE (HCC): Primary | ICD-10-CM

## 2024-09-27 DIAGNOSIS — I42.8 NICM (NONISCHEMIC CARDIOMYOPATHY) (HCC): ICD-10-CM

## 2024-09-27 PROCEDURE — G8417 CALC BMI ABV UP PARAM F/U: HCPCS | Performed by: INTERNAL MEDICINE

## 2024-09-27 PROCEDURE — G8427 DOCREV CUR MEDS BY ELIG CLIN: HCPCS | Performed by: INTERNAL MEDICINE

## 2024-09-27 PROCEDURE — 4004F PT TOBACCO SCREEN RCVD TLK: CPT | Performed by: INTERNAL MEDICINE

## 2024-09-27 PROCEDURE — 99214 OFFICE O/P EST MOD 30 MIN: CPT | Performed by: INTERNAL MEDICINE

## 2024-09-27 RX ORDER — BUDESONIDE AND FORMOTEROL FUMARATE DIHYDRATE 160; 4.5 UG/1; UG/1
AEROSOL RESPIRATORY (INHALATION)
COMMUNITY
Start: 2024-07-22

## 2024-09-27 RX ORDER — ROSUVASTATIN CALCIUM 40 MG/1
40 TABLET, COATED ORAL NIGHTLY
Qty: 90 TABLET | Refills: 3 | Status: SHIPPED | OUTPATIENT
Start: 2024-09-27

## 2024-09-27 RX ORDER — ASPIRIN 81 MG/1
81 TABLET, CHEWABLE ORAL DAILY
Qty: 90 TABLET | Refills: 3 | Status: SHIPPED | OUTPATIENT
Start: 2024-09-27

## 2024-09-27 RX ORDER — FUROSEMIDE 40 MG
40 TABLET ORAL DAILY
Qty: 90 TABLET | Refills: 3 | Status: SHIPPED | OUTPATIENT
Start: 2024-09-27

## 2024-09-27 RX ORDER — METOPROLOL SUCCINATE 25 MG/1
25 TABLET, EXTENDED RELEASE ORAL 2 TIMES DAILY
Qty: 180 TABLET | Refills: 3 | Status: SHIPPED | OUTPATIENT
Start: 2024-09-27

## 2024-09-27 RX ORDER — DAPAGLIFLOZIN 10 MG/1
10 TABLET, FILM COATED ORAL EVERY MORNING
Qty: 90 TABLET | Refills: 3 | Status: SHIPPED | OUTPATIENT
Start: 2024-09-27

## 2024-09-27 RX ORDER — GABAPENTIN 300 MG/1
300 CAPSULE ORAL 2 TIMES DAILY
COMMUNITY
Start: 2024-06-20

## 2024-10-04 ENCOUNTER — HOSPITAL ENCOUNTER (OUTPATIENT)
Age: 44
Discharge: HOME | End: 2024-10-06
Attending: INTERNAL MEDICINE
Payer: COMMERCIAL

## 2024-10-04 VITALS
DIASTOLIC BLOOD PRESSURE: 60 MMHG | BODY MASS INDEX: 30.38 KG/M2 | SYSTOLIC BLOOD PRESSURE: 110 MMHG | WEIGHT: 217 LBS | HEIGHT: 71 IN

## 2024-10-04 DIAGNOSIS — I42.9 CARDIOMYOPATHY, UNSPECIFIED TYPE (HCC): ICD-10-CM

## 2024-10-04 PROCEDURE — 93308 TTE F-UP OR LMTD: CPT

## 2024-10-05 LAB
ECHO AO ROOT DIAM: 3.5 CM
ECHO AO ROOT INDEX: 1.61 CM/M2
ECHO AV AREA PEAK VELOCITY: 4.3 CM2
ECHO AV AREA VTI: 3.5 CM2
ECHO AV AREA/BSA PEAK VELOCITY: 2 CM2/M2
ECHO AV AREA/BSA VTI: 1.6 CM2/M2
ECHO AV MEAN GRADIENT: 2 MMHG
ECHO AV MEAN VELOCITY: 0.7 M/S
ECHO AV PEAK GRADIENT: 2 MMHG
ECHO AV PEAK VELOCITY: 0.8 M/S
ECHO AV VELOCITY RATIO: 0.88
ECHO AV VTI: 16.6 CM
ECHO BSA: 2.22 M2
ECHO LA AREA 2C: 16 CM2
ECHO LA AREA 4C: 13.6 CM2
ECHO LA MAJOR AXIS: 4.4 CM
ECHO LA MINOR AXIS: 4.5 CM
ECHO LA VOL BP: 39 ML (ref 18–58)
ECHO LA VOL MOD A2C: 46 ML (ref 18–58)
ECHO LA VOL MOD A4C: 32 ML (ref 18–58)
ECHO LA VOL/BSA BIPLANE: 18 ML/M2 (ref 16–34)
ECHO LA VOLUME INDEX MOD A2C: 21 ML/M2 (ref 16–34)
ECHO LA VOLUME INDEX MOD A4C: 15 ML/M2 (ref 16–34)
ECHO LV E' LATERAL VELOCITY: 8.8 CM/S
ECHO LV E' SEPTAL VELOCITY: 7.1 CM/S
ECHO LV EDV A2C: 103 ML
ECHO LV EDV A4C: 85 ML
ECHO LV EDV INDEX A4C: 39 ML/M2
ECHO LV EDV NDEX A2C: 47 ML/M2
ECHO LV EJECTION FRACTION A2C: 58 %
ECHO LV EJECTION FRACTION A4C: 48 %
ECHO LV EJECTION FRACTION BIPLANE: 56 % (ref 55–100)
ECHO LV ESV A2C: 44 ML
ECHO LV ESV A4C: 45 ML
ECHO LV ESV INDEX A2C: 20 ML/M2
ECHO LV ESV INDEX A4C: 21 ML/M2
ECHO LV FRACTIONAL SHORTENING: 14 % (ref 28–44)
ECHO LV INTERNAL DIMENSION DIASTOLE INDEX: 2.25 CM/M2
ECHO LV INTERNAL DIMENSION DIASTOLIC: 4.9 CM (ref 4.2–5.9)
ECHO LV INTERNAL DIMENSION SYSTOLIC INDEX: 1.93 CM/M2
ECHO LV INTERNAL DIMENSION SYSTOLIC: 4.2 CM
ECHO LV IVSD: 0.9 CM (ref 0.6–1)
ECHO LV MASS 2D: 164.3 G (ref 88–224)
ECHO LV MASS INDEX 2D: 75.4 G/M2 (ref 49–115)
ECHO LV POSTERIOR WALL DIASTOLIC: 1 CM (ref 0.6–1)
ECHO LV RELATIVE WALL THICKNESS RATIO: 0.41
ECHO LVOT AREA: 4.5 CM2
ECHO LVOT AV VTI INDEX: 0.77
ECHO LVOT DIAM: 2.4 CM
ECHO LVOT MEAN GRADIENT: 1 MMHG
ECHO LVOT PEAK GRADIENT: 2 MMHG
ECHO LVOT PEAK VELOCITY: 0.7 M/S
ECHO LVOT STROKE VOLUME INDEX: 26.5 ML/M2
ECHO LVOT SV: 57.9 ML
ECHO LVOT VTI: 12.8 CM
ECHO MV A VELOCITY: 0.63 M/S
ECHO MV AREA VTI: 2.9 CM2
ECHO MV E DECELERATION TIME (DT): 217 MS
ECHO MV E VELOCITY: 0.56 M/S
ECHO MV E/A RATIO: 0.89
ECHO MV E/E' LATERAL: 6.36
ECHO MV E/E' RATIO (AVERAGED): 7.13
ECHO MV E/E' SEPTAL: 7.89
ECHO MV LVOT VTI INDEX: 1.54
ECHO MV MAX VELOCITY: 0.7 M/S
ECHO MV MEAN GRADIENT: 1 MMHG
ECHO MV MEAN VELOCITY: 0.5 M/S
ECHO MV PEAK GRADIENT: 2 MMHG
ECHO MV VTI: 19.7 CM
ECHO PV ACCELERATION TIME (AT): 108 MS
ECHO PV MAX VELOCITY: 1 M/S
ECHO PV PEAK GRADIENT: 4 MMHG
ECHO RA AREA 4C: 16.4 CM2
ECHO RA END SYSTOLIC VOLUME APICAL 4 CHAMBER INDEX BSA: 22 ML/M2
ECHO RA VOLUME: 47 ML
ECHO RV BASAL DIMENSION: 3.9 CM
ECHO RV FREE WALL PEAK S': 9.8 CM/S
ECHO RV MID DIMENSION: 2.1 CM
ECHO RV TAPSE: 1.8 CM (ref 1.7–?)

## 2024-10-05 PROCEDURE — 93325 DOPPLER ECHO COLOR FLOW MAPG: CPT | Performed by: INTERNAL MEDICINE

## 2024-10-05 PROCEDURE — 93308 TTE F-UP OR LMTD: CPT | Performed by: INTERNAL MEDICINE

## 2024-10-05 PROCEDURE — 93321 DOPPLER ECHO F-UP/LMTD STD: CPT | Performed by: INTERNAL MEDICINE

## 2024-10-21 ENCOUNTER — TELEPHONE (OUTPATIENT)
Dept: CARDIOLOGY CLINIC | Age: 44
End: 2024-10-21

## 2024-10-22 NOTE — TELEPHONE ENCOUNTER
Patient overall feeling well, no swelling or weight gain. Patient instructed to call if symptoms develop.

## 2024-10-22 NOTE — TELEPHONE ENCOUNTER
CHF Screening Questionaire    Optivol/Corvue indicating elevated thoracic impedance? Yes     Are you SOB? No     How long has this SOB been going on? No     Has your weight gone up? No     What is your weight? 218 lb     What is your baseline weight? 218 lb     Do you weigh yourself daily? No  (patients should weigh themselves daily, at the same time of day, wearing the same amount of clothing, first thing in the morning after urinating - please encourage them to continue with daily weights with or without symptoms)    Do you have any swelling in your feet or legs? No     Is your abdomen bloated? No      Can you lie flat at night to sleep? Yes      Are you requiring extra pillows at night due to SOB? No      Are you waking up at night because you can't breathe? No      Are you hungry but feeling full quickly? No      Are you pants or socks tighter than normal? No      Have you missed any medications especially diuretics? No      Have you been eating out more or had a recent increase in salty foods? No      Any chest discomfort, dizziness or lightheadedness? No              Any signs or symptoms of heart failure - please refer to Saskia or Daija to make an appointment for an evaluation.

## 2025-02-03 ENCOUNTER — TELEPHONE (OUTPATIENT)
Dept: CARDIOLOGY CLINIC | Age: 45
End: 2025-02-03

## 2025-02-03 DIAGNOSIS — I42.8 NICM (NONISCHEMIC CARDIOMYOPATHY) (HCC): ICD-10-CM

## 2025-02-03 DIAGNOSIS — I42.9 CARDIOMYOPATHY, UNSPECIFIED TYPE (HCC): ICD-10-CM

## 2025-02-03 RX ORDER — DAPAGLIFLOZIN 10 MG/1
10 TABLET, FILM COATED ORAL EVERY MORNING
Qty: 90 TABLET | Refills: 3 | Status: SHIPPED | OUTPATIENT
Start: 2025-02-03

## 2025-02-03 NOTE — TELEPHONE ENCOUNTER
I Medication Refills:      sacubitril-valsartan (ENTRESTO)  MG per tablet [[id number]]    Order Details  Dose: 1 tablet Route: Oral Frequency: 2 TIMES DAILY   Dispense Quantity: 180 tablet Refills: 3      dapagliflozin (FARXIGA) 10 MG tablet [[id number]]    Order Details  Dose: 10 mg Route: Oral Frequency: EVERY MORNING   Dispense Quantity: 90 tablet Refills: 3      Patient stated he's completely out of the Entresto and has a week left of the Farxiga.  He stated the pharmacy said he needs a prior auth.    Last Office Visit: 9/27/24    Next Office Visit: 4/3/25    [place] [address] - P [phone] - F [phone] [[id number]]

## 2025-06-23 ENCOUNTER — TELEPHONE (OUTPATIENT)
Dept: CARDIOLOGY CLINIC | Age: 45
End: 2025-06-23

## 2025-06-23 NOTE — TELEPHONE ENCOUNTER
Optivol trending up, please call pt and ask about wt and HF sx, looks like pt overdue f/u. CAROLYNV

## 2025-06-23 NOTE — TELEPHONE ENCOUNTER
Called and spoke to patient, he reports he is feeling well from a cardiovascular stand point, denies SOB, denies and swelling, or bloating. Pt stated his weights have remained the same at 215lbs. RN offered to make patient a FU apt with Dr. Hawk and patient declined, and stated he would call back at a later date to make a follow up apt.